# Patient Record
Sex: MALE | Race: WHITE | Employment: OTHER | URBAN - METROPOLITAN AREA
[De-identification: names, ages, dates, MRNs, and addresses within clinical notes are randomized per-mention and may not be internally consistent; named-entity substitution may affect disease eponyms.]

---

## 2017-01-02 DIAGNOSIS — I48.20 CHRONIC ATRIAL FIBRILLATION (H): Primary | ICD-10-CM

## 2017-01-02 RX ORDER — POTASSIUM CHLORIDE 1500 MG/1
20 TABLET, EXTENDED RELEASE ORAL 2 TIMES DAILY
Qty: 180 TABLET | Refills: 0 | Status: SHIPPED | OUTPATIENT
Start: 2017-01-02 | End: 2017-04-04

## 2017-01-02 NOTE — TELEPHONE ENCOUNTER
Potassium Chloride    HISTORICAL    Last Written Prescription Date: 09/27/16  Last Fill Quantity: 180, # refills: 0  Last Office Visit with G, P or [facility] prescribing provider: 03/18/16       POTASSIUM   Date Value Ref Range Status   03/18/2016 4.0 3.4 - 5.3 mmol/L Final     CREATININE   Date Value Ref Range Status   03/18/2016 1.00 0.66 - 1.25 mg/dL Final     BP Readings from Last 3 Encounters:   11/16/16 124/80   05/17/16 128/84   03/18/16 106/74

## 2017-01-25 ENCOUNTER — ANTICOAGULATION THERAPY VISIT (OUTPATIENT)
Dept: ANTICOAGULATION | Facility: CLINIC | Age: 67
End: 2017-01-25
Payer: COMMERCIAL

## 2017-01-25 DIAGNOSIS — I48.20 CHRONIC ATRIAL FIBRILLATION (H): ICD-10-CM

## 2017-01-25 DIAGNOSIS — Z79.01 LONG-TERM (CURRENT) USE OF ANTICOAGULANTS: Primary | ICD-10-CM

## 2017-01-25 LAB — INR POINT OF CARE: 3.1 (ref 0.86–1.14)

## 2017-01-25 PROCEDURE — 36416 COLLJ CAPILLARY BLOOD SPEC: CPT

## 2017-01-25 PROCEDURE — 85610 PROTHROMBIN TIME: CPT | Mod: QW

## 2017-01-25 PROCEDURE — 99207 ZZC NO CHARGE NURSE ONLY: CPT

## 2017-01-25 NOTE — PROGRESS NOTES
ANTICOAGULATION FOLLOW-UP CLINIC VISIT    Patient Name:  Angelito Espinoza  Date:  1/25/2017  Contact Type:  Face to Face    SUBJECTIVE:     Patient Findings     Positives No Problem Findings           OBJECTIVE    INR PROTIME   Date Value Ref Range Status   01/25/2017 3.1* 0.86 - 1.14 Final       ASSESSMENT / PLAN  INR assessment THER    Recheck INR In: 4 WEEKS    INR Location Clinic      Anticoagulation Summary as of 1/25/2017     INR goal 2.0-3.0   Selected INR 3.1! (1/25/2017)   Maintenance plan 5 mg (5 mg x 1) on Tue; 7.5 mg (5 mg x 1.5) all other days   Full instructions 5 mg on Tue; 7.5 mg all other days   Weekly total 50 mg   No change documented Nneka Gomez, RN   Plan last modified Sofya Luther RN (11/15/2016)   Next INR check 2/22/2017   Target end date     Indications   Long-term (current) use of anticoagulants [Z79.01] [Z79.01]  Chronic atrial fibrillation (H) [I48.2]         Anticoagulation Episode Summary     INR check location     Preferred lab     Send INR reminders to [facility]    Comments             See the Encounter Report to view Anticoagulation Flowsheet and Dosing Calendar (Go to Encounters tab in chart review, and find the Anticoagulation Therapy Visit)        Nneka Gomez RN

## 2017-02-27 ENCOUNTER — ANTICOAGULATION THERAPY VISIT (OUTPATIENT)
Dept: ANTICOAGULATION | Facility: CLINIC | Age: 67
End: 2017-02-27
Payer: COMMERCIAL

## 2017-02-27 DIAGNOSIS — I48.20 CHRONIC ATRIAL FIBRILLATION (H): ICD-10-CM

## 2017-02-27 DIAGNOSIS — Z79.01 LONG-TERM (CURRENT) USE OF ANTICOAGULANTS: ICD-10-CM

## 2017-02-27 LAB — INR POINT OF CARE: 2.2 (ref 0.86–1.14)

## 2017-02-27 PROCEDURE — 36416 COLLJ CAPILLARY BLOOD SPEC: CPT

## 2017-02-27 PROCEDURE — 85610 PROTHROMBIN TIME: CPT | Mod: QW

## 2017-02-27 PROCEDURE — 99207 ZZC NO CHARGE NURSE ONLY: CPT

## 2017-02-27 RX ORDER — LISINOPRIL 40 MG/1
40 TABLET ORAL DAILY
Qty: 90 TABLET | Refills: 0 | Status: SHIPPED | OUTPATIENT
Start: 2017-02-27 | End: 2017-04-04

## 2017-02-27 RX ORDER — AMLODIPINE BESYLATE 5 MG/1
5 TABLET ORAL DAILY
Qty: 90 TABLET | Refills: 0 | Status: SHIPPED | OUTPATIENT
Start: 2017-02-27 | End: 2017-04-04

## 2017-02-27 RX ORDER — METOPROLOL SUCCINATE 200 MG/1
200 TABLET, EXTENDED RELEASE ORAL 2 TIMES DAILY
Qty: 180 TABLET | Refills: 0 | Status: SHIPPED | OUTPATIENT
Start: 2017-02-27 | End: 2017-04-04

## 2017-02-27 NOTE — PROGRESS NOTES
ANTICOAGULATION FOLLOW-UP CLINIC VISIT    Patient Name:  Angelito Espinoza  Date:  2/27/2017  Contact Type:  Face to Face    SUBJECTIVE:     Patient Findings     Positives No Problem Findings           OBJECTIVE    INR Protime   Date Value Ref Range Status   02/27/2017 2.2 (A) 0.86 - 1.14 Final       ASSESSMENT / PLAN  INR assessment THER    Recheck INR In: 4 WEEKS    INR Location Clinic      Anticoagulation Summary as of 2/27/2017     INR goal 2.0-3.0   Today's INR 2.2   Maintenance plan 5 mg (5 mg x 1) on Tue; 7.5 mg (5 mg x 1.5) all other days   Full instructions 5 mg on Tue; 7.5 mg all other days   Weekly total 50 mg   No change documented Nneka Gomez, RN   Plan last modified Sofya Luther RN (11/15/2016)   Next INR check 3/28/2017   Target end date     Indications   Long-term (current) use of anticoagulants [Z79.01] [Z79.01]  Chronic atrial fibrillation (H) [I48.2]         Anticoagulation Episode Summary     INR check location     Preferred lab     Send INR reminders to [facility]    Comments             See the Encounter Report to view Anticoagulation Flowsheet and Dosing Calendar (Go to Encounters tab in chart review, and find the Anticoagulation Therapy Visit)        Nneka Gomez RN

## 2017-02-27 NOTE — MR AVS SNAPSHOT
Angelito Espinoza   2/27/2017 3:45 PM   Anticoagulation Therapy Visit    Description:  66 year old male   Provider:   ANTICOAGULATION CLINIC   Department:   Anti Coagulation           INR as of 2/27/2017     Today's INR 2.2      Anticoagulation Summary as of 2/27/2017     INR goal 2.0-3.0   Today's INR 2.2   Full instructions 5 mg on Tue; 7.5 mg all other days   Next INR check 3/28/2017    Indications   Long-term (current) use of anticoagulants [Z79.01] [Z79.01]  Chronic atrial fibrillation (H) [I48.2]         Your next Anticoagulation Clinic appointment(s)     Mar 29, 2017 11:30 AM CDT   Anticoagulation Visit with  ANTICOAGULATION CLINIC   [facility]    [address] [place] [address] [phone]              Contact Numbers     [facility]  Please call  [phone] to cancel and/or reschedule your appointment   Please call  [phone] with any problems or questions regarding your therapy.        February 2017 Details    Sun Mon Tue Wed Thu Fri Sat        1               2               3               4                 5               6               7               8               9               10               11                 12               13               14               15               16               17               18                 19               20               21               22               23               24               25                 26               27      7.5 mg   See details      28      5 mg              Date Details   02/27 This INR check               How to take your warfarin dose     To take:  5 mg Take 1 of the 5 mg tablets.    To take:  7.5 mg Take 1.5 of the 5 mg tablets.           March 2017 Details    Sun Mon Tue Wed Thu Fri Sat        1      7.5 mg         2      7.5 mg         3      7.5 mg         4      7.5 mg           5      7.5 mg         6      7.5 mg         7       5 mg         8      7.5 mg         9      7.5 mg         10      7.5 mg         11      7.5 mg           12      7.5 mg         13      7.5 mg         14      5 mg         15      7.5 mg         16      7.5 mg         17      7.5 mg         18      7.5 mg           19      7.5 mg         20      7.5 mg         21      5 mg         22      7.5 mg         23      7.5 mg         24      7.5 mg         25      7.5 mg           26      7.5 mg         27      7.5 mg         28            29               30               31                 Date Details   No additional details    Date of next INR:  3/28/2017         How to take your warfarin dose     To take:  5 mg Take 1 of the 5 mg tablets.    To take:  7.5 mg Take 1.5 of the 5 mg tablets.

## 2017-02-27 NOTE — TELEPHONE ENCOUNTER
metoprolol       Last Written Prescription Date: 03/18/16  Last Fill Quantity: 05/23/16, # refills: 180    Last Office Visit with [facility] or [facility] prescribing provider:  2   Future Office Visit:        BP Readings from Last 3 Encounters:   11/16/16 124/80   05/17/16 128/84   03/18/16 106/74     Lisinopril      Last Written Prescription Date: 05/23/16  Last Fill Quantity: 180, # refills: 2  Last Office Visit with [facility] or  [facility] prescribing provider: 03/18/16       Potassium   Date Value Ref Range Status   03/18/2016 4.0 3.4 - 5.3 mmol/L Final     Creatinine   Date Value Ref Range Status   03/18/2016 1.00 0.66 - 1.25 mg/dL Final     BP Readings from Last 3 Encounters:   11/16/16 124/80   05/17/16 128/84   03/18/16 106/74       Amlodipine      Last Written Prescription Date: 11/25/16  Last Fill Quantity: 90, # refills: 0    Last Office Visit with [facility] or  Health prescribing provider:  03/18/16   Future Office Visit:        BP Readings from Last 3 Encounters:   11/16/16 124/80   05/17/16 128/84   03/18/16 106/74

## 2017-03-07 DIAGNOSIS — I48.20 CHRONIC ATRIAL FIBRILLATION (H): ICD-10-CM

## 2017-03-07 RX ORDER — WARFARIN SODIUM 5 MG/1
7.5 TABLET ORAL DAILY
Qty: 45 TABLET | Refills: 0 | Status: SHIPPED
Start: 2017-03-07 | End: 2017-04-04

## 2017-03-07 NOTE — TELEPHONE ENCOUNTER
warfarin    Last Written Prescription Date: 11/25/16  Last Fill Qty: 135, # refills: 0  Last Office Visit with G, P or [facility] prescribing provider: 03/18/16       Date and Result of Last PT/INR:   Lab Results   Component Value Date    INR 2.2 02/27/2017    INR 3.1 01/25/2017    INR 2.16 09/13/2015    INR 3.00 09/25/2014

## 2017-04-04 ENCOUNTER — OFFICE VISIT (OUTPATIENT)
Dept: INTERNAL MEDICINE | Facility: CLINIC | Age: 67
End: 2017-04-04
Payer: COMMERCIAL

## 2017-04-04 ENCOUNTER — ANTICOAGULATION THERAPY VISIT (OUTPATIENT)
Dept: ANTICOAGULATION | Facility: CLINIC | Age: 67
End: 2017-04-04
Payer: COMMERCIAL

## 2017-04-04 VITALS
SYSTOLIC BLOOD PRESSURE: 114 MMHG | BODY MASS INDEX: 42.66 KG/M2 | WEIGHT: 315 LBS | HEIGHT: 72 IN | DIASTOLIC BLOOD PRESSURE: 72 MMHG | OXYGEN SATURATION: 97 % | TEMPERATURE: 98.1 F | HEART RATE: 78 BPM

## 2017-04-04 DIAGNOSIS — I48.20 CHRONIC ATRIAL FIBRILLATION (H): Primary | ICD-10-CM

## 2017-04-04 DIAGNOSIS — I10 ESSENTIAL HYPERTENSION, BENIGN: ICD-10-CM

## 2017-04-04 DIAGNOSIS — E66.01 MORBID OBESITY DUE TO EXCESS CALORIES (H): ICD-10-CM

## 2017-04-04 DIAGNOSIS — Z12.5 SPECIAL SCREENING FOR MALIGNANT NEOPLASM OF PROSTATE: ICD-10-CM

## 2017-04-04 DIAGNOSIS — Z12.11 COLON CANCER SCREENING: ICD-10-CM

## 2017-04-04 DIAGNOSIS — E78.5 HYPERLIPIDEMIA LDL GOAL <130: ICD-10-CM

## 2017-04-04 LAB
ALBUMIN SERPL-MCNC: 3.6 G/DL (ref 3.4–5)
ALP SERPL-CCNC: 71 U/L (ref 40–150)
ALT SERPL W P-5'-P-CCNC: 24 U/L (ref 0–70)
ANION GAP SERPL CALCULATED.3IONS-SCNC: 7 MMOL/L (ref 3–14)
AST SERPL W P-5'-P-CCNC: ABNORMAL U/L (ref 0–45)
BILIRUB SERPL-MCNC: 1.2 MG/DL (ref 0.2–1.3)
BUN SERPL-MCNC: 23 MG/DL (ref 7–30)
CALCIUM SERPL-MCNC: 9 MG/DL (ref 8.5–10.1)
CHLORIDE SERPL-SCNC: 109 MMOL/L (ref 94–109)
CHOLEST SERPL-MCNC: 191 MG/DL
CO2 SERPL-SCNC: 26 MMOL/L (ref 20–32)
CREAT SERPL-MCNC: 1.16 MG/DL (ref 0.66–1.25)
GFR SERPL CREATININE-BSD FRML MDRD: 63 ML/MIN/1.7M2
GLUCOSE SERPL-MCNC: 108 MG/DL (ref 70–99)
HDLC SERPL-MCNC: 54 MG/DL
INR POINT OF CARE: 3.3 (ref 0.86–1.14)
LDLC SERPL CALC-MCNC: 114 MG/DL
NONHDLC SERPL-MCNC: 137 MG/DL
POTASSIUM SERPL-SCNC: ABNORMAL MMOL/L (ref 3.4–5.3)
PROT SERPL-MCNC: 7.5 G/DL (ref 6.8–8.8)
PSA SERPL-ACNC: 0.35 UG/L (ref 0–4)
SODIUM SERPL-SCNC: 142 MMOL/L (ref 133–144)
TRIGL SERPL-MCNC: 113 MG/DL

## 2017-04-04 PROCEDURE — 99213 OFFICE O/P EST LOW 20 MIN: CPT | Performed by: INTERNAL MEDICINE

## 2017-04-04 PROCEDURE — 85610 PROTHROMBIN TIME: CPT | Mod: QW

## 2017-04-04 PROCEDURE — 80053 COMPREHEN METABOLIC PANEL: CPT | Performed by: INTERNAL MEDICINE

## 2017-04-04 PROCEDURE — 36416 COLLJ CAPILLARY BLOOD SPEC: CPT

## 2017-04-04 PROCEDURE — G0103 PSA SCREENING: HCPCS | Performed by: INTERNAL MEDICINE

## 2017-04-04 PROCEDURE — 80061 LIPID PANEL: CPT | Performed by: INTERNAL MEDICINE

## 2017-04-04 RX ORDER — WARFARIN SODIUM 5 MG/1
7.5 TABLET ORAL DAILY
Qty: 135 TABLET | Refills: 3 | Status: SHIPPED | OUTPATIENT
Start: 2017-04-04

## 2017-04-04 RX ORDER — AMLODIPINE BESYLATE 5 MG/1
5 TABLET ORAL DAILY
Qty: 90 TABLET | Refills: 3 | Status: SHIPPED | OUTPATIENT
Start: 2017-04-04

## 2017-04-04 RX ORDER — LISINOPRIL 40 MG/1
40 TABLET ORAL DAILY
Qty: 90 TABLET | Refills: 3 | Status: SHIPPED | OUTPATIENT
Start: 2017-04-04

## 2017-04-04 RX ORDER — FUROSEMIDE 20 MG
20 TABLET ORAL 2 TIMES DAILY
Qty: 180 TABLET | Refills: 3 | Status: SHIPPED | OUTPATIENT
Start: 2017-04-04

## 2017-04-04 RX ORDER — METOPROLOL SUCCINATE 200 MG/1
200 TABLET, EXTENDED RELEASE ORAL 2 TIMES DAILY
Qty: 180 TABLET | Refills: 3 | Status: SHIPPED | OUTPATIENT
Start: 2017-04-04

## 2017-04-04 RX ORDER — POTASSIUM CHLORIDE 1500 MG/1
20 TABLET, EXTENDED RELEASE ORAL 2 TIMES DAILY
Qty: 180 TABLET | Refills: 3 | Status: SHIPPED | OUTPATIENT
Start: 2017-04-04

## 2017-04-04 NOTE — MR AVS SNAPSHOT
Angelito Espinoza   4/4/2017 3:15 PM   Anticoagulation Therapy Visit    Description:  66 year old male   Provider:   ANTICOAGULATION CLINIC   Department:   Anti Coagulation           INR as of 4/4/2017     Today's INR 3.3!      Anticoagulation Summary as of 4/4/2017     INR goal 2.0-3.0   Today's INR 3.3!   Full instructions 4/5: 5 mg; Otherwise 5 mg on Tue; 7.5 mg all other days   Next INR check 5/3/2017    Indications   Long-term (current) use of anticoagulants [Z79.01] [Z79.01]  Chronic atrial fibrillation (H) [I48.2]         Your next Anticoagulation Clinic appointment(s)     May 03, 2017 11:30 AM CDT   Anticoagulation Visit with  ANTICOAGULATION CLINIC   [facility]    [address] [place] [address] [phone]              Contact Numbers     [facility]  Please call  [phone] to cancel and/or reschedule your appointment   Please call  [phone] with any problems or questions regarding your therapy.        April 2017 Details    Sun Mon Tue Wed Thu Fri Sat           1                 2               3               4      5 mg   See details      5      5 mg         6      7.5 mg         7      7.5 mg         8      7.5 mg           9      7.5 mg         10      7.5 mg         11      5 mg         12      7.5 mg         13      7.5 mg         14      7.5 mg         15      7.5 mg           16      7.5 mg         17      7.5 mg         18      5 mg         19      7.5 mg         20      7.5 mg         21      7.5 mg         22      7.5 mg           23      7.5 mg         24      7.5 mg         25      5 mg         26      7.5 mg         27      7.5 mg         28      7.5 mg         29      7.5 mg           30      7.5 mg                Date Details   04/04 This INR check               How to take your warfarin dose     To take:  5 mg Take 1 of the 5 mg tablets.    To take:  7.5 mg Take 1.5 of the 5 mg tablets.            May 2017 Details    Sun Mon Tue Wed Thu Fri Sat      1      7.5 mg         2      5 mg         3            4               5               6                 7               8               9               10               11               12               13                 14               15               16               17               18               19               20                 21               22               23               24               25               26               27                 28               29               30               31                   Date Details   No additional details    Date of next INR:  5/3/2017         How to take your warfarin dose     To take:  5 mg Take 1 of the 5 mg tablets.    To take:  7.5 mg Take 1.5 of the 5 mg tablets.

## 2017-04-04 NOTE — LETTER
[facility]  [address]  [address]      April 5, 2017      Angelito Espinoza  [address]  [facility] [address]          Dear Angelito,      I have enclosed a copy of your most recent labs done here at the clinic and if available some of your prior labs for comparison.     I am pleased to inform you that your routine blood work including your sodium, potassium, calcium, kidney and liver function tests are all stable.    Your blood sugar function test is slightly abnormal and elevated and should be rechecked here in the clinic in 6 months with a follow-up visit with me, case.  I will look forward to seeing you at that time and please call to make an appointment.  In the meantime, please work on your diet limiting your carbohydrates and getting some good, regular exercise.    Your LDL cholesterol is slightly high and could be treated more aggressively.  Please follow-up with me to discuss your further medication options if you are interested.    In addition, your PSA or prostate screening antigen is normal and should be repeated annually.    Please call me if you have further questions.        Guevara Billy MD

## 2017-04-04 NOTE — PROGRESS NOTES
ANTICOAGULATION FOLLOW-UP CLINIC VISIT    Patient Name:  Angelito Espinoza  Date:  4/4/2017  Contact Type:  Face to Face    SUBJECTIVE:     Patient Findings     Positives No Problem Findings           OBJECTIVE    INR Protime   Date Value Ref Range Status   04/04/2017 3.3 (A) 0.86 - 1.14 Final       ASSESSMENT / PLAN  INR assessment SUPRA    Recheck INR In: 4 WEEKS    INR Location Clinic      Anticoagulation Summary as of 4/4/2017     INR goal 2.0-3.0   Today's INR 3.3!   Maintenance plan 5 mg (5 mg x 1) on Tue; 7.5 mg (5 mg x 1.5) all other days   Full instructions 4/5: 5 mg; Otherwise 5 mg on Tue; 7.5 mg all other days   Weekly total 50 mg   Plan last modified Sofya Luther RN (11/15/2016)   Next INR check 5/3/2017   Target end date     Indications   Long-term (current) use of anticoagulants [Z79.01] [Z79.01]  Chronic atrial fibrillation (H) [I48.2]         Anticoagulation Episode Summary     INR check location     Preferred lab     Send INR reminders to [facility]    Comments             See the Encounter Report to view Anticoagulation Flowsheet and Dosing Calendar (Go to Encounters tab in chart review, and find the Anticoagulation Therapy Visit)        Sofya Luther RN

## 2017-04-04 NOTE — NURSING NOTE
Chief Complaint   Patient presents with     Recheck Medication       Initial /72  Pulse 78  Temp 98.1  F (36.7  C) (Oral)  Ht 6' (1.829 m)  Wt (!) 400 lb 8 oz (181.7 kg)  SpO2 97%  BMI 54.32 kg/m2 Estimated body mass index is 54.32 kg/(m^2) as calculated from the following:    Height as of this encounter: 6' (1.829 m).    Weight as of this encounter: 400 lb 8 oz (181.7 kg).  Medication Reconciliation: complete

## 2017-04-04 NOTE — MR AVS SNAPSHOT
After Visit Summary   4/4/2017    Angelito Espinoza    MRN: [id number]           Patient Information     Date Of Birth          1950        Visit Information        Provider Department      4/4/2017 3:40 PM Guevara Billy MD [facility]        Today's Diagnoses     Chronic atrial fibrillation (H)    -  1    Morbid obesity due to excess calories (HCC) BMI 50-55        Essential hypertension, benign        Hyperlipidemia LDL goal <130        Special screening for malignant neoplasm of prostate        Colon cancer screening           Follow-ups after your visit        Additional Services     GASTROENTEROLOGY ADULT REF PROCEDURE ONLY       Last Lab Result: Creatinine (mg/dL)       Date                     Value                 03/18/2016               1.00             ----------  Body mass index is 54.32 kg/(m^2).     Needed:  No  Language:  English    Patient will be contacted to schedule procedure.     Please be aware that coverage of these services is subject to the terms and limitations of your health insurance plan.  Call member services at your health plan with any benefit or coverage questions.  Any procedures must be performed at a [facility] facility OR coordinated by your clinic's referral office.    Please bring the following with you to your appointment:    (1) Any X-Rays, CTs or MRIs which have been performed.  Contact the facility where they were done to arrange for  prior to your scheduled appointment.    (2) List of current medications   (3) This referral request   (4) Any documents/labs given to you for this referral                  Your next 10 appointments already scheduled     May 03, 2017 11:30 AM CDT   Anticoagulation Visit with  ANTICOAGULATION [facility] ([facility])    [address] [address] [phone]              Who to contact     If you have  questions or need follow up information about today's clinic visit or your schedule please contact [facility] directly at [phone].  Normal or non-critical lab and imaging results will be communicated to you by MyChart, letter or phone within 4 business days after the clinic has received the results. If you do not hear from us within 7 days, please contact the clinic through MyChart or phone. If you have a critical or abnormal lab result, we will notify you by phone as soon as possible.  Submit refill requests through [employer] or call your pharmacy and they will forward the refill request to us. Please allow 3 business days for your refill to be completed.          Additional Information About Your Visit        [employer] Information     [employer] gives you secure access to your electronic health record. If you see a primary care provider, you can also send messages to your care team and make appointments. If you have questions, please call your primary care clinic.  If you do not have a primary care provider, please call [phone] and they will assist you.        Care EveryWhere ID     This is your Care EveryWhere ID. This could be used by other organizations to access your [place] medical records  [id number]        Your Vitals Were     Pulse Temperature Height Pulse Oximetry BMI (Body Mass Index)       78 98.1  F (36.7  C) (Oral) 6' (1.829 m) 97% 54.32 kg/m2        Blood Pressure from Last 3 Encounters:   04/04/17 114/72   11/16/16 124/80   05/17/16 128/84    Weight from Last 3 Encounters:   04/04/17 (!) 400 lb 8 oz (181.7 kg)   11/16/16 (!) 405 lb 14.4 oz (184.1 kg)   05/17/16 (!) 380 lb (172.4 kg)              We Performed the Following     Comprehensive metabolic panel     GASTROENTEROLOGY ADULT REF PROCEDURE ONLY     Lipid Profile     PSA, screen          Where to get your medicines      These medications were sent to [facility] [address] [place], [place] - [address]  AT [facility] [address] [place] [address] [facility] [address]     Phone:  [phone]     amLODIPine 5 MG tablet    furosemide 20 MG tablet    lisinopril 40 MG tablet    metoprolol 200 MG 24 hr tablet    potassium chloride SA 20 MEQ CR tablet         Some of these will need a paper prescription and others can be bought over the counter.  Ask your nurse if you have questions.     Bring a paper prescription for each of these medications     warfarin 5 MG tablet          Primary Care Provider Office Phone # Fax #    Guevara Billy [phone]       [facility] [address]  [facility] [address]        Thank you!     Thank you for choosing [facility]  for your care. Our goal is always to provide you with excellent care. Hearing back from our patients is one way we can continue to improve our services. Please take a few minutes to complete the written survey that you may receive in the mail after your visit with us. Thank you!             Your Updated Medication List - Protect others around you: Learn how to safely use, store and throw away your medicines at www.disposemymeds.org.          This list is accurate as of: 4/4/17  3:57 PM.  Always use your most recent med list.                   Brand Name Dispense Instructions for use    amLODIPine 5 MG tablet    NORVASC    90 tablet    Take 1 tablet (5 mg) by mouth daily       furosemide 20 MG tablet    LASIX    180 tablet    Take 1 tablet (20 mg) by mouth 2 times daily       HYDROcodone-acetaminophen 5-325 MG per tablet    NORCO    8 tablet    Take 1-2 tablets by mouth every 4 hours as needed for moderate to severe pain       lisinopril 40 MG tablet    PRINIVIL/ZESTRIL    90 tablet    Take 1 tablet (40 mg) by mouth daily       metoprolol 200 MG 24 hr tablet    TOPROL XL    180 tablet    Take 1 tablet (200 mg) by mouth 2 times daily Verified with patient this is the XL       multivitamin, therapeutic with  minerals Tabs tablet      Take 1 tablet by mouth daily       potassium chloride SA 20 MEQ CR tablet    potassium chloride    180 tablet    Take 1 tablet (20 mEq) by mouth 2 times daily       warfarin 5 MG tablet    COUMADIN    135 tablet    Take 1.5 tablets (7.5 mg) by mouth daily Except 1 tablet on Tues as directed by the anticoagulation clinic, an appt needed with primary MD pror to refills

## 2017-05-01 ENCOUNTER — TELEPHONE (OUTPATIENT)
Dept: INTERNAL MEDICINE | Facility: CLINIC | Age: 67
End: 2017-05-01

## 2017-05-01 NOTE — TELEPHONE ENCOUNTER
"Pt is trying to get a colonoscopy scheduled with [facility], but before procedure can be scheduled, [facility] needs to receive orders from PCP to \"hold pt's warfarin for 4 days prior to colonoscopy, and bridging recommendations.\"  Please call the ph# from [facility] [phone]  And press option 1, and then 1 again, and give PCP orders to the .  "

## 2017-05-01 NOTE — TELEPHONE ENCOUNTER
Called [facility] and spoke with RINA.  Informed per Dr. Billy OK to hold coumadin for colonoscopy.  PJ stated she will contact patient to let him know.

## 2017-05-03 ENCOUNTER — ANTICOAGULATION THERAPY VISIT (OUTPATIENT)
Dept: ANTICOAGULATION | Facility: CLINIC | Age: 67
End: 2017-05-03
Payer: COMMERCIAL

## 2017-05-03 DIAGNOSIS — I48.20 CHRONIC ATRIAL FIBRILLATION (H): ICD-10-CM

## 2017-05-03 DIAGNOSIS — Z79.01 LONG-TERM (CURRENT) USE OF ANTICOAGULANTS: ICD-10-CM

## 2017-05-03 LAB — INR POINT OF CARE: 2.5 (ref 0.86–1.14)

## 2017-05-03 PROCEDURE — 85610 PROTHROMBIN TIME: CPT | Mod: QW

## 2017-05-03 PROCEDURE — 36416 COLLJ CAPILLARY BLOOD SPEC: CPT

## 2017-05-03 PROCEDURE — 99207 ZZC NO CHARGE NURSE ONLY: CPT

## 2017-05-03 NOTE — MR AVS SNAPSHOT
Angelito Espinoza   5/3/2017 11:30 AM   Anticoagulation Therapy Visit    Description:  66 year old male   Provider:   ANTICOAGULATION CLINIC   Department:   Anti Coagulation           INR as of 5/3/2017     Today's INR 2.5      Anticoagulation Summary as of 5/3/2017     INR goal 2.0-3.0   Today's INR 2.5   Full instructions 5 mg on Tue; 7.5 mg all other days   Next INR check 5/31/2017    Indications   Long-term (current) use of anticoagulants [Z79.01] [Z79.01]  Chronic atrial fibrillation (H) [I48.2]         Your next Anticoagulation Clinic appointment(s)     May 31, 2017 11:30 AM CDT   Anticoagulation Visit with  ANTICOAGULATION CLINIC   [facility] ([facility]    [address] [place] [address] [phone]              Contact Numbers     [facility]  Please call  [phone] to cancel and/or reschedule your appointment   Please call  [phone] with any problems or questions regarding your therapy.        May 2017 Details    Sun Mon Tue Wed Thu Fri Sat      1               2               3      7.5 mg   See details      4      7.5 mg         5      7.5 mg         6      7.5 mg           7      7.5 mg         8      7.5 mg         9      5 mg         10      7.5 mg         11      7.5 mg         12      7.5 mg         13      7.5 mg           14      7.5 mg         15      7.5 mg         16      5 mg         17      7.5 mg         18      7.5 mg         19      7.5 mg         20      7.5 mg           21      7.5 mg         22      7.5 mg         23      5 mg         24      7.5 mg         25      7.5 mg         26      7.5 mg         27      7.5 mg           28      7.5 mg         29      7.5 mg         30      5 mg         31                Date Details   05/03 This INR check       Date of next INR:  5/31/2017         How to take your warfarin dose     To take:  5 mg Take 1 of the 5 mg tablets.    To take:  7.5 mg Take 1.5 of the  5 mg tablets.

## 2017-05-03 NOTE — PROGRESS NOTES
ANTICOAGULATION FOLLOW-UP CLINIC VISIT    Patient Name:  Angelito Espinoza  Date:  5/3/2017  Contact Type:  Face to Face    SUBJECTIVE:        OBJECTIVE    INR Protime   Date Value Ref Range Status   05/03/2017 2.5 (A) 0.86 - 1.14 Final       ASSESSMENT / PLAN  INR assessment THER    Recheck INR In: 4 WEEKS    INR Location Clinic      Anticoagulation Summary as of 5/3/2017     INR goal 2.0-3.0   Today's INR 2.5   Maintenance plan 5 mg (5 mg x 1) on Tue; 7.5 mg (5 mg x 1.5) all other days   Full instructions 5 mg on Tue; 7.5 mg all other days   Weekly total 50 mg   No change documented Morenita Brink RN   Plan last modified Sofya Luther RN (11/15/2016)   Next INR check 5/31/2017   Target end date     Indications   Long-term (current) use of anticoagulants [Z79.01] [Z79.01]  Chronic atrial fibrillation (H) [I48.2]         Anticoagulation Episode Summary     INR check location     Preferred lab     Send INR reminders to [facility]    Comments             See the Encounter Report to view Anticoagulation Flowsheet and Dosing Calendar (Go to Encounters tab in chart review, and find the Anticoagulation Therapy Visit)    Dosage adjustment made based on physician directed care plan.    Morenita Brink RN

## 2017-05-31 ENCOUNTER — ANTICOAGULATION THERAPY VISIT (OUTPATIENT)
Dept: ANTICOAGULATION | Facility: CLINIC | Age: 67
End: 2017-05-31
Payer: COMMERCIAL

## 2017-05-31 DIAGNOSIS — I48.20 CHRONIC ATRIAL FIBRILLATION (H): ICD-10-CM

## 2017-05-31 DIAGNOSIS — Z79.01 LONG-TERM (CURRENT) USE OF ANTICOAGULANTS: ICD-10-CM

## 2017-05-31 PROCEDURE — 99207 ZZC NO CHARGE NURSE ONLY: CPT

## 2017-05-31 NOTE — MR AVS SNAPSHOT
Angelito Espinoza   5/31/2017 11:30 AM   Anticoagulation Therapy Visit    Description:  66 year old male   Provider:   ANTICOAGULATION CLINIC   Department:   Anti Coagulation           INR as of 5/31/2017     Today's INR       Anticoagulation Summary as of 5/31/2017     INR goal 2.0-3.0   Today's INR    Full instructions 5/31: 5 mg; Otherwise 5 mg on Tue; 7.5 mg all other days   Next INR check 7/6/2017    Indications   Long-term (current) use of anticoagulants [Z79.01] [Z79.01]  Chronic atrial fibrillation (H) [I48.2]         Your next Anticoagulation Clinic appointment(s)     Jul 06, 2017 12:00 PM CDT   Anticoagulation Visit with  ANTICOAGULATION CLINIC   [facility] ([facility])    [address] [place] [id number] [phone]              Contact Numbers     [facility]  Please call  [phone] to cancel and/or reschedule your appointment   Please call  [phone] with any problems or questions regarding your therapy.        May 2017 Details    Sun Mon Tue Wed Thu Fri Sat      1               2               3               4               5               6                 7               8               9               10               11               12               13                 14               15               16               17               18               19               20                 21               22               23               24               25               26               27                 28               29               30               31      5 mg   See details          Date Details   05/31 This INR check               How to take your warfarin dose     To take:  5 mg Take 1 of the 5 mg tablets.           June 2017 Details    Sun Mon Tue Wed Thu Fri Sat         1      7.5 mg         2      7.5 mg         3      7.5 mg           4      7.5 mg         5      7.5 mg         6      5 mg          7      7.5 mg         8      7.5 mg         9      7.5 mg         10      7.5 mg           11      7.5 mg         12      7.5 mg         13      5 mg         14      7.5 mg         15      7.5 mg         16      7.5 mg         17      7.5 mg           18      7.5 mg         19      7.5 mg         20      5 mg         21      7.5 mg         22      7.5 mg         23      7.5 mg         24      7.5 mg           25      7.5 mg         26      7.5 mg         27      5 mg         28      7.5 mg         29      7.5 mg         30      7.5 mg           Date Details   No additional details            How to take your warfarin dose     To take:  5 mg Take 1 of the 5 mg tablets.    To take:  7.5 mg Take 1.5 of the 5 mg tablets.           July 2017 Details    Sun Mon Tue Wed Thu Fri Sat           1      7.5 mg           2      7.5 mg         3      7.5 mg         4      5 mg         5      7.5 mg         6            7               8                 9               10               11               12               13               14               15                 16               17               18               19               20               21               22                 23               24               25               26               27               28               29                 30               31                     Date Details   No additional details    Date of next INR:  7/6/2017         How to take your warfarin dose     To take:  5 mg Take 1 of the 5 mg tablets.    To take:  7.5 mg Take 1.5 of the 5 mg tablets.

## 2017-06-01 NOTE — PROGRESS NOTES
ANTICOAGULATION FOLLOW-UP CLINIC VISIT    Patient Name:  Angelito Espinoza  Date:  05/31/2017  Contact Type:  Face to Face    SUBJECTIVE:     Patient Findings     Positives No Problem Findings           OBJECTIVE    INR Protime   Date Value Ref Range Status   05/03/2017 2.5 (A) 0.86 - 1.14 Final       ASSESSMENT / PLAN  No question data found.  Anticoagulation Summary as of 5/31/2017     INR goal 2.0-3.0   Today's INR    Maintenance plan 5 mg (5 mg x 1) on Tue; 7.5 mg (5 mg x 1.5) all other days   Full instructions 5/31: 5 mg; Otherwise 5 mg on Tue; 7.5 mg all other days   Weekly total 50 mg   Plan last modified Sofya Luther RN (11/15/2016)   Next INR check 7/6/2017   Target end date     Indications   Long-term (current) use of anticoagulants [Z79.01] [Z79.01]  Chronic atrial fibrillation (H) [I48.2]         Anticoagulation Episode Summary     INR check location     Preferred lab     Send INR reminders to  [facility]    Comments             See the Encounter Report to view Anticoagulation Flowsheet and Dosing Calendar (Go to Encounters tab in chart review, and find the Anticoagulation Therapy Visit)        Nneka Gomez RN

## 2017-07-06 ENCOUNTER — TRANSFERRED RECORDS (OUTPATIENT)
Dept: HEALTH INFORMATION MANAGEMENT | Facility: CLINIC | Age: 67
End: 2017-07-06

## 2017-07-06 ENCOUNTER — ANTICOAGULATION THERAPY VISIT (OUTPATIENT)
Dept: ANTICOAGULATION | Facility: CLINIC | Age: 67
End: 2017-07-06
Payer: COMMERCIAL

## 2017-07-06 DIAGNOSIS — I48.20 CHRONIC ATRIAL FIBRILLATION (H): ICD-10-CM

## 2017-07-06 DIAGNOSIS — Z79.01 LONG-TERM (CURRENT) USE OF ANTICOAGULANTS: ICD-10-CM

## 2017-07-06 LAB — INR PPP: 2.4

## 2017-07-06 PROCEDURE — 99207 ZZC NO CHARGE NURSE ONLY: CPT | Performed by: INTERNAL MEDICINE

## 2017-07-06 NOTE — MR AVS SNAPSHOT
Angelito Espinoza   7/6/2017   Anticoagulation Therapy Visit    Description:  66 year old male   Provider:  Guevara Billy MD   Department:   Anti Coagulation           INR as of 7/6/2017     Today's INR 2.4      Anticoagulation Summary as of 7/6/2017     INR goal 2.0-3.0   Today's INR 2.4   Full instructions 5 mg on Tue; 7.5 mg all other days   Next INR check 8/10/2017    Indications   Long-term (current) use of anticoagulants [Z79.01] [Z79.01]  Chronic atrial fibrillation (H) [I48.2]         Contact Numbers     [facility] [address]  Please call  [phone] to cancel and/or reschedule your appointment   Please call  [phone] with any problems or questions regarding your therapy.        July 2017 Details    Sun Mon Tue Wed Thu Fri Sat           1                 2               3               4               5               6      7.5 mg   See details      7      7.5 mg         8      7.5 mg           9      7.5 mg         10      7.5 mg         11      5 mg         12      7.5 mg         13      7.5 mg         14      7.5 mg         15      7.5 mg           16      7.5 mg         17      7.5 mg         18      5 mg         19      7.5 mg         20      7.5 mg         21      7.5 mg         22      7.5 mg           23      7.5 mg         24      7.5 mg         25      5 mg         26      7.5 mg         27      7.5 mg         28      7.5 mg         29      7.5 mg           30      7.5 mg         31      7.5 mg               Date Details   07/06 This INR check               How to take your warfarin dose     To take:  5 mg Take 1 of the 5 mg tablets.    To take:  7.5 mg Take 1.5 of the 5 mg tablets.           August 2017 Details    Sun Mon Tue Wed Thu Fri Sat       1      5 mg         2      7.5 mg         3      7.5 mg         4      7.5 mg         5      7.5 mg           6      7.5 mg         7      7.5 mg         8      5 mg         9      7.5 mg         10            11               12                  13               14               15               16               17               18               19                 20               21               22               23               24               25               26                 27               28               29               30               31                  Date Details   No additional details    Date of next INR:  8/10/2017         How to take your warfarin dose     To take:  5 mg Take 1 of the 5 mg tablets.    To take:  7.5 mg Take 1.5 of the 5 mg tablets.

## 2017-07-13 NOTE — PROGRESS NOTES
ANTICOAGULATION FOLLOW-UP CLINIC VISIT    Patient Name:  Angelito Espinoza  Date:  7/06/2017  Contact Type:  Telephone/ dosing called to pt     SUBJECTIVE:     Patient Findings     Positives No Problem Findings    Comments Pt is currently out of town           OBJECTIVE    INR   Date Value Ref Range Status   07/06/2017 2.4  Final       ASSESSMENT / PLAN  No question data found.  Anticoagulation Summary as of 7/6/2017     INR goal 2.0-3.0   Today's INR 2.4   Maintenance plan 5 mg (5 mg x 1) on Tue; 7.5 mg (5 mg x 1.5) all other days   Full instructions 5 mg on Tue; 7.5 mg all other days   Weekly total 50 mg   No change documented Morenita Brink RN   Plan last modified Sofya Luther RN (11/15/2016)   Next INR check 8/10/2017   Target end date     Indications   Long-term (current) use of anticoagulants [Z79.01] [Z79.01]  Chronic atrial fibrillation (H) [I48.2]         Anticoagulation Episode Summary     INR check location     Preferred lab     Send INR reminders to [facility]    Comments             See the Encounter Report to view Anticoagulation Flowsheet and Dosing Calendar (Go to Encounters tab in chart review, and find the Anticoagulation Therapy Visit)    Dosage adjustment made based on physician directed care plan.    Morenita Brink, RN

## 2017-07-21 ENCOUNTER — OFFICE VISIT (OUTPATIENT)
Dept: INTERNAL MEDICINE | Facility: CLINIC | Age: 67
End: 2017-07-21
Payer: COMMERCIAL

## 2017-07-21 VITALS
WEIGHT: 315 LBS | DIASTOLIC BLOOD PRESSURE: 76 MMHG | TEMPERATURE: 97.6 F | HEIGHT: 72 IN | OXYGEN SATURATION: 94 % | HEART RATE: 84 BPM | BODY MASS INDEX: 42.66 KG/M2 | SYSTOLIC BLOOD PRESSURE: 110 MMHG

## 2017-07-21 DIAGNOSIS — Z12.11 SPECIAL SCREENING FOR MALIGNANT NEOPLASMS, COLON: ICD-10-CM

## 2017-07-21 DIAGNOSIS — I48.20 CHRONIC ATRIAL FIBRILLATION (H): ICD-10-CM

## 2017-07-21 DIAGNOSIS — I10 ESSENTIAL HYPERTENSION, BENIGN: ICD-10-CM

## 2017-07-21 DIAGNOSIS — Z79.01 LONG-TERM (CURRENT) USE OF ANTICOAGULANTS: ICD-10-CM

## 2017-07-21 DIAGNOSIS — Z01.818 PREOP GENERAL PHYSICAL EXAM: Primary | ICD-10-CM

## 2017-07-21 DIAGNOSIS — E78.5 HYPERLIPIDEMIA LDL GOAL <130: ICD-10-CM

## 2017-07-21 LAB
ANION GAP SERPL CALCULATED.3IONS-SCNC: 6 MMOL/L (ref 3–14)
BUN SERPL-MCNC: 21 MG/DL (ref 7–30)
CALCIUM SERPL-MCNC: 9 MG/DL (ref 8.5–10.1)
CHLORIDE SERPL-SCNC: 107 MMOL/L (ref 94–109)
CO2 SERPL-SCNC: 29 MMOL/L (ref 20–32)
CREAT SERPL-MCNC: 1.16 MG/DL (ref 0.66–1.25)
GFR SERPL CREATININE-BSD FRML MDRD: 63 ML/MIN/1.7M2
GLUCOSE SERPL-MCNC: 109 MG/DL (ref 70–99)
HGB BLD-MCNC: 15.3 G/DL (ref 13.3–17.7)
POTASSIUM SERPL-SCNC: 4.2 MMOL/L (ref 3.4–5.3)
SODIUM SERPL-SCNC: 142 MMOL/L (ref 133–144)

## 2017-07-21 PROCEDURE — 85018 HEMOGLOBIN: CPT | Performed by: PHYSICIAN ASSISTANT

## 2017-07-21 PROCEDURE — 99214 OFFICE O/P EST MOD 30 MIN: CPT | Performed by: PHYSICIAN ASSISTANT

## 2017-07-21 PROCEDURE — 36415 COLL VENOUS BLD VENIPUNCTURE: CPT | Performed by: PHYSICIAN ASSISTANT

## 2017-07-21 PROCEDURE — 80048 BASIC METABOLIC PNL TOTAL CA: CPT | Performed by: PHYSICIAN ASSISTANT

## 2017-07-21 NOTE — PATIENT INSTRUCTIONS
Hold lisinopril lasix and potassium the morning of procedure    DO TAKE your amlodipine, and metoprolol with a sip of water.      Before Your Surgery      Call your surgeon if there is any change in your health. This includes signs of a cold or flu (such as a sore throat, runny nose, cough, rash or fever).    Do not smoke, drink alcohol or take over the counter medicine (unless your surgeon or primary care doctor tells you to) for the 24 hours before and after surgery.    If you take prescribed drugs: Follow your doctor s orders about which medicines to take and which to stop until after surgery.    Eating and drinking prior to surgery: follow the instructions from your surgeon    Take a shower or bath the night before surgery. Use the soap your surgeon gave you to gently clean your skin. If you do not have soap from your surgeon, use your regular soap. Do not shave or scrub the surgery site.  Wear clean pajamas and have clean sheets on your bed.

## 2017-07-21 NOTE — MR AVS SNAPSHOT
After Visit Summary   7/21/2017    Angelito Espinoza    MRN: [id number]           Patient Information     Date Of Birth          1950        Visit Information        Provider Department      7/21/2017 2:20 PM Rosa Velez PA-C [facility]        Today's Diagnoses     Preop general physical exam    -  1    Special screening for malignant neoplasms, colon        Chronic atrial fibrillation (H)        Long-term (current) use of anticoagulants [Z79.01]        Essential hypertension, benign        Hyperlipidemia LDL goal <130          Care Instructions    Hold lisinopril lasix and potassium the morning of procedure    DO TAKE your amlodipine, and metoprolol with a sip of water.      Before Your Surgery      Call your surgeon if there is any change in your health. This includes signs of a cold or flu (such as a sore throat, runny nose, cough, rash or fever).    Do not smoke, drink alcohol or take over the counter medicine (unless your surgeon or primary care doctor tells you to) for the 24 hours before and after surgery.    If you take prescribed drugs: Follow your doctor s orders about which medicines to take and which to stop until after surgery.    Eating and drinking prior to surgery: follow the instructions from your surgeon    Take a shower or bath the night before surgery. Use the soap your surgeon gave you to gently clean your skin. If you do not have soap from your surgeon, use your regular soap. Do not shave or scrub the surgery site.  Wear clean pajamas and have clean sheets on your bed.           Follow-ups after your visit        Your next 10 appointments already scheduled     Jul 24, 2017   Procedure with Jet Loja DO   [facility] ([facility])    [address]  [place] [address]   [phone]           [facility] is located at [address] [place]              Future tests that were  ordered for you today     Open Future Orders        Priority Expected Expires Ordered    Potassium Routine 7/20/2017 8/31/2017 7/20/2017    Glucose Routine 7/20/2017 8/31/2017 7/20/2017    Hemoglobin Routine 7/20/2017 8/31/2017 7/20/2017    Platelet count Routine 7/20/2017 8/31/2017 7/20/2017            Who to contact     If you have questions or need follow up information about today's clinic visit or your schedule please contact [facility] directly at [phone].  Normal or non-critical lab and imaging results will be communicated to you by [employer], letter or phone within 4 business days after the clinic has received the results. If you do not hear from us within 7 days, please contact the clinic through [employer] or phone. If you have a critical or abnormal lab result, we will notify you by phone as soon as possible.  Submit refill requests through [employer] or call your pharmacy and they will forward the refill request to us. Please allow 3 business days for your refill to be completed.          Additional Information About Your Visit        UT[employer]hart Information     [employer] gives you secure access to your electronic health record. If you see a primary care provider, you can also send messages to your care team and make appointments. If you have questions, please call your primary care clinic.  If you do not have a primary care provider, please call [phone] and they will assist you.        Care EveryWhere ID     This is your Care EveryWhere ID. This could be used by other organizations to access your [place] medical records  [id number]        Your Vitals Were     Pulse Temperature Height Pulse Oximetry BMI (Body Mass Index)       84 97.6  F (36.4  C) (Oral) 6' (1.829 m) 94% 53.27 kg/m2        Blood Pressure from Last 3 Encounters:   07/21/17 110/76   04/04/17 114/72   11/16/16 124/80    Weight from Last 3 Encounters:   07/21/17 (!) 392 lb 12.8 oz (178.2 kg)   04/04/17 (!) 400 lb 8 oz  (181.7 kg)   11/16/16 (!) 405 lb 14.4 oz (184.1 kg)              We Performed the Following     Basic metabolic panel     Hemoglobin        Primary Care Provider Office Phone # Fax #    Guevara Billy [phone]       [facility] [address] [facility] [address]        Equal Access to Services     DARIA AGUIRRE : Hadii aad ku hadasho Soomaali, waaxda luqadaha, qaybta kaalmada adeegyada, waxay idiin hayaan adeeg kharash la'aan ah. So [facility] [phone].    ATENCIÓN: Si habla español, tiene a philippe disposición servicios gratuitos de asistencia lingüística. Llame al [phone].    We comply with applicable federal civil rights laws and [place] laws. We do not discriminate on the basis of race, color, national origin, age, disability sex, sexual orientation or gender identity.            Thank you!     Thank you for choosing [facility]  for your care. Our goal is always to provide you with excellent care. Hearing back from our patients is one way we can continue to improve our services. Please take a few minutes to complete the written survey that you may receive in the mail after your visit with us. Thank you!             Your Updated Medication List - Protect others around you: Learn how to safely use, store and throw away your medicines at www.disposemymeds.org.          This list is accurate as of: 7/21/17  2:35 PM.  Always use your most recent med list.                   Brand Name Dispense Instructions for use Diagnosis    amLODIPine 5 MG tablet    NORVASC    90 tablet    Take 1 tablet (5 mg) by mouth daily    Essential hypertension, benign       furosemide 20 MG tablet    LASIX    180 tablet    Take 1 tablet (20 mg) by mouth 2 times daily    Essential hypertension, benign       HYDROcodone-acetaminophen 5-325 MG per tablet    NORCO    8 tablet    Take 1-2 tablets by mouth every 4 hours as needed for moderate to severe pain        lisinopril 40 MG tablet     PRINIVIL/ZESTRIL    90 tablet    Take 1 tablet (40 mg) by mouth daily    Chronic atrial fibrillation (H), Essential hypertension, benign       metoprolol 200 MG 24 hr tablet    TOPROL XL    180 tablet    Take 1 tablet (200 mg) by mouth 2 times daily Verified with patient this is the XL    Chronic atrial fibrillation (H), Essential hypertension, benign       multivitamin, therapeutic with minerals Tabs tablet      Take 1 tablet by mouth daily        potassium chloride SA 20 MEQ CR tablet    potassium chloride    180 tablet    Take 1 tablet (20 mEq) by mouth 2 times daily    Essential hypertension, benign       warfarin 5 MG tablet    COUMADIN    135 tablet    Take 1.5 tablets (7.5 mg) by mouth daily Except 1 tablet on Tues as directed by the anticoagulation clinic, an appt needed with primary MD pror to refills    Chronic atrial fibrillation (H)

## 2017-07-21 NOTE — PROGRESS NOTES
[address] [address]  [phone]  Dept: [phone]    PRE-OP EVALUATION:  Today's date: 2017    Angelito Espinoza (: 1950) presents for pre-operative evaluation assessment as requested by Dr. Loja.  He requires evaluation and anesthesia risk assessment prior to undergoing surgery/procedure for treatment of Colonoscopy  .  Proposed procedure:     Date of Surgery/ Procedure:   Time of Surgery/ Procedure: 7 am   Hospital/Surgical Facility: [facility]     Primary Physician: Guevara Billy  Type of Anesthesia Anticipated: General    Patient has a Health Care Directive or Living Will:  NO    1. NO - Do you have a history of heart attack, stroke, stent, bypass or surgery on an artery in the head, neck, heart or legs?  2. YES - DO YOU EVER HAVE ANY PAIN OR DISCOMFORT IN YOUR CHEST? Mild, infrequent- No heart issues other than  Hypertension and a fib, some intermittent chest wall pain   3. NO - Do you have a history of  Heart Failure?  4. YES - ARE YOUR TROUBLED BY SHORTNESS OF BREATH WHEN WALKING ON THE LEVEL, UP A SLIGHT HILL OR AT NIGHT? Walking up a hill - deconditioning   5. NO - Do you currently have a cold, bronchitis or other respiratory infection?  6. NO - Do you have a cough, shortness of breath or wheezing?  7. NO - Do you sometimes get pains in the calves of your legs when you walk?  8. NO - Do you or anyone in your family have previous history of blood clots?  9. NO - Do you or does anyone in your family have a serious bleeding problem such as prolonged bleeding following surgeries or cuts?  10. NO - Have you ever had problems with anemia or been told to take iron pills?  11. NO - Have you had any abnormal blood loss such as black, tarry or bloody stools, or abnormal vaginal bleeding?  12. NO - Have you ever had a blood transfusion?  13. NO - Have you or any of your relatives ever had problems with anesthesia?  14. YES -  DO YOU HAVE SLEEP APNEA, EXCESSIVE SNORING OR DAYTIME DROWSINESS? Sleep apnea   15. NO - Do you have any prosthetic heart valves?  16. NO - Do you have prosthetic joints?  17. NO - Is there any chance that you may be pregnant?        HPI:                                                      Brief HPI related to upcoming procedure:colonoscopy for screening       See problem list for active medical problems.  Problems all longstanding and stable, except as noted/documented.  See ROS for pertinent symptoms related to these conditions.                                                                                                  .  HYPERTENSION - Patient has longstanding history of mod-severe HTN , currently denies any symptoms referable to elevated blood pressure. Specifically denies chest pain, palpitations, dyspnea, orthopnea, PND or peripheral edema. Blood pressure readings have been in normal range. Current medication regimen is as listed below. Patient denies any side effects of medication.                                                                                                                                                                                          .  HYPERLIPIDEMIA - Patient has a long history of Hyperlipidemia  No medication for treatment with recent fair control.   SLEEP PROBLEM - Patient has a longstanding history of sleep apnea of moderate severity.                                                                                                                                        .    MEDICAL HISTORY:                                                    Patient Active Problem List    Diagnosis Date Noted     Essential hypertension, benign 04/04/2017     Priority: Medium     Hyperlipidemia LDL goal <130 04/04/2017     Priority: Medium     ACP (advance care planning) 03/18/2016     Priority: Medium     Advance Care Planning 3/18/2016: ACP Review of Chart / Resources Provided:  Reviewed  chart for advance care plan.  Angelito Espinoza has no plan or code status on file. Discussed available resources and provided with information. Pt declined form at this time.  Added by Telma Rivers             Personal history of tobacco use, presenting hazards to health: 10- 43y/o @  03/18/2016     Priority: Medium     Stasis dermatitis of both legs 03/18/2016     Priority: Medium     Edema, unspecified edema of bilat legs  03/18/2016     Priority: Medium     Long-term (current) use of anticoagulants [Z79.01] 01/18/2016     Priority: Medium     Morbid obesity due to excess calories (HCC) BMI 50-55 11/19/2015     Priority: Medium     Chronic atrial fibrillation (H) 09/23/2015     Priority: Medium     Lung nodules < 4mm bibasilar/ abdom CT  9-15 09/23/2015     Priority: Medium     Cellulitis 09/18/2014     Priority: Medium      Past Medical History:   Diagnosis Date     Atrial fibrillation (H)      Hypertension      Lymph edema      Past Surgical History:   Procedure Laterality Date     CHOLECYSTECTOMY       ENT SURGERY       EYE SURGERY       Current Outpatient Prescriptions   Medication Sig Dispense Refill     amLODIPine (NORVASC) 5 MG tablet Take 1 tablet (5 mg) by mouth daily 90 tablet 3     furosemide (LASIX) 20 MG tablet Take 1 tablet (20 mg) by mouth 2 times daily 180 tablet 3     lisinopril (PRINIVIL/ZESTRIL) 40 MG tablet Take 1 tablet (40 mg) by mouth daily 90 tablet 3     metoprolol (TOPROL XL) 200 MG 24 hr tablet Take 1 tablet (200 mg) by mouth 2 times daily Verified with patient this is the  tablet 3     potassium chloride SA (POTASSIUM CHLORIDE) 20 MEQ CR tablet Take 1 tablet (20 mEq) by mouth 2 times daily 180 tablet 3     HYDROcodone-acetaminophen (NORCO) 5-325 MG per tablet Take 1-2 tablets by mouth every 4 hours as needed for moderate to severe pain 8 tablet 0     multivitamin, therapeutic with minerals (THERA-VIT-M) TABS Take 1 tablet by mouth daily       warfarin (COUMADIN) 5 MG tablet Take  1.5 tablets (7.5 mg) by mouth daily Except 1 tablet on Tues as directed by the anticoagulation clinic, an appt needed with primary MD pror to refills (Patient not taking: Reported on 7/21/2017) 135 tablet 3     OTC products: None, except as noted above and no recent use of OTC ASA, NSAIDS or Steroids    No Known Allergies   Latex Allergy: NO    Social History   Substance Use Topics     Smoking status: Former Smoker     Quit date: 12/8/1994     Smokeless tobacco: Never Used     Alcohol use No     History   Drug Use No       REVIEW OF SYSTEMS:                                                    C: NEGATIVE for fever, chills, change in weight  INTEGUMENTARY/SKIN: NEGATIVE for worrisome rashes, moles or lesions  EYES: NEGATIVE for vision changes or irritation  E/M: NEGATIVE for ear, mouth and throat problems  R: NEGATIVE for significant cough or SOB  CV: NEGATIVE for chest pain, palpitations or peripheral edema  GI: NEGATIVE for nausea, abdominal pain, heartburn, or change in bowel habits  MUSCULOSKELETAL: NEGATIVE for significant arthralgias or myalgia  NEURO: NEGATIVE for weakness, dizziness or paresthesias  ENDOCRINE: NEGATIVE for temperature intolerance, skin/hair changes  HEME/ALLERGY/IMMUNE: NEGATIVE for bleeding problems  PSYCHIATRIC: NEGATIVE for changes in mood or affect  ROS otherwise negative    EXAM:                                                    /76 (BP Location: Left arm, Patient Position: Chair, Cuff Size: Adult Large)  Pulse 84  Temp 97.6  F (36.4  C) (Oral)  Ht 6' (1.829 m)  Wt (!) 392 lb 12.8 oz (178.2 kg)  SpO2 94%  BMI 53.27 kg/m2  GENERAL APPEARANCE: healthy, alert and no distress  HENT: ear canals and TM's normal and nose and mouth without ulcers or lesions  RESP: lungs clear to auscultation - no rales, rhonchi or wheezes  CV: regular rate and rhythm, normal S1 S2, no S3 or S4 and no murmur, click or rub   ABDOMEN: soft, nontender, no HSM or masses and bowel sounds normal  MS:  extremities normal- no gross deformities noted  SKIN: no suspicious lesions or rashes  NEURO: Normal strength and tone, sensory exam grossly normal, mentation intact and speech normal  PSYCH: mentation appears normal and affect normal/bright    DIAGNOSTICS:                                                      EKG: Not indicated due to non-vascular surgery and low risk of event for colonoscopy   Labs Resulted Today: nonfasting   Results for orders placed or performed in visit on 07/21/17   Basic metabolic panel   Result Value Ref Range    Sodium 142 133 - 144 mmol/L    Potassium 4.2 3.4 - 5.3 mmol/L    Chloride 107 94 - 109 mmol/L    Carbon Dioxide 29 20 - 32 mmol/L    Anion Gap 6 3 - 14 mmol/L    Glucose 109 (H) 70 - 99 mg/dL    Urea Nitrogen 21 7 - 30 mg/dL    Creatinine 1.16 0.66 - 1.25 mg/dL    GFR Estimate 63 >60 mL/min/1.7m2    GFR Estimate If Black 76 >60 mL/min/1.7m2    Calcium 9.0 8.5 - 10.1 mg/dL   Hemoglobin   Result Value Ref Range    Hemoglobin 15.3 13.3 - 17.7 g/dL       Recent Labs   Lab Test 07/06/17 05/03/17 04/04/17   1608   03/18/16   1639   09/13/15   2020  09/25/14   0650   HGB   --    --    --    --    --    --   15.6  11.8*   PLT   --    --    --    --    --    --   174  193   INR  2.4  2.5*   --    < >   --    < >  2.16*  3.00*   NA   --    --   142   --   139   --   139  145*   POTASSIUM   --    --   Unsatisfactory specimen - hemolyzed   Unsatisfactory specimen - hemolyzed     --   4.0   --   3.9  3.8   CR   --    --   1.16   --   1.00   --   1.01  1.49*   A1C   --    --    --    --   5.5   --    --    --     < > = values in this interval not displayed.        IMPRESSION:                                                    Reason for surgery/procedure: colonoscopy for screening   Diagnosis/reason for consult:A fib, long term use of anticoag, HTN, hyperlipidemia     The proposed surgical procedure is considered LOW risk.    REVISED CARDIAC RISK INDEX  The patient has the following serious  cardiovascular risks for perioperative complications such as (MI, PE, VFib and 3  AV Block):  No serious cardiac risks  INTERPRETATION: 0 risks: Class I (very low risk - 0.4% complication rate)    The patient has the following additional risks for perioperative complications:  No identified additional risks      ICD-10-CM    1. Preop general physical exam Z01.818 Basic metabolic panel     Hemoglobin   2. Special screening for malignant neoplasms, colon Z12.11 Basic metabolic panel     Hemoglobin   3. Chronic atrial fibrillation (H) I48.2 Basic metabolic panel     Hemoglobin   4. Long-term (current) use of anticoagulants [Z79.01] Z79.01    5. Essential hypertension, benign I10 Hemoglobin   6. Hyperlipidemia LDL goal <130 E78.5        RECOMMENDATIONS:                                                        Cardiovascular Risk  Patient is already on a Beta Blocker. Continue Betablocker therapy after surgery, using Beta blocker order set as necessary for NPO status.      Pulmonary Risk  Continue c pap          --Patient is to take all scheduled medications on the day of surgery EXCEPT for modifications listed below.    Anticoagulant or Antiplatelet Medication Use  WARFARIN: Thromboembolic risk is low (e.g. Single VTE >12 months ago, A fib and CHADS<3 without prior CVA/TIA) and warfarin may be discontinued in the perioperative period        ACE Inhibitor or Angiotensin Receptor Blocker (ARB) Use  Ace inhibitor or Angiotensin Receptor Blocker (ARB) and should HOLD this medication for the 24 hours prior to surgery.  Will hold lasix and potassium am of procedure.        APPROVAL GIVEN to proceed with proposed procedure, without further diagnostic evaluation       Signed Electronically by: Rosa Velez PA-C    Copy of this evaluation report is provided to requesting physician.    [facility] Preop Guidelines

## 2017-07-21 NOTE — NURSING NOTE
Chief Complaint   Patient presents with     Pre-Op Exam     colonoscopy 07/24       Initial /76 (BP Location: Left arm, Patient Position: Chair, Cuff Size: Adult Large)  Pulse 84  Temp 97.6  F (36.4  C) (Oral)  Ht 6' (1.829 m)  Wt (!) 392 lb 12.8 oz (178.2 kg)  SpO2 94%  BMI 53.27 kg/m2 Estimated body mass index is 53.27 kg/(m^2) as calculated from the following:    Height as of this encounter: 6' (1.829 m).    Weight as of this encounter: 392 lb 12.8 oz (178.2 kg).  Medication Reconciliation: complete

## 2017-07-24 ENCOUNTER — ANESTHESIA EVENT (OUTPATIENT)
Dept: GASTROENTEROLOGY | Facility: CLINIC | Age: 67
End: 2017-07-24
Payer: COMMERCIAL

## 2017-07-24 ENCOUNTER — HOSPITAL ENCOUNTER (OUTPATIENT)
Facility: CLINIC | Age: 67
Discharge: HOME | End: 2017-07-24
Attending: INTERNAL MEDICINE | Admitting: INTERNAL MEDICINE
Payer: COMMERCIAL

## 2017-07-24 ENCOUNTER — ANESTHESIA (OUTPATIENT)
Dept: GASTROENTEROLOGY | Facility: CLINIC | Age: 67
End: 2017-07-24
Payer: COMMERCIAL

## 2017-07-24 VITALS
WEIGHT: 315 LBS | BODY MASS INDEX: 42.66 KG/M2 | SYSTOLIC BLOOD PRESSURE: 114 MMHG | HEIGHT: 72 IN | RESPIRATION RATE: 11 BRPM | OXYGEN SATURATION: 91 % | DIASTOLIC BLOOD PRESSURE: 90 MMHG

## 2017-07-24 LAB
COLONOSCOPY: NORMAL
INR PPP: 1.2 (ref 0.86–1.14)

## 2017-07-24 PROCEDURE — 45381 COLONOSCOPY SUBMUCOUS NJX: CPT | Performed by: INTERNAL MEDICINE

## 2017-07-24 PROCEDURE — 88305 TISSUE EXAM BY PATHOLOGIST: CPT | Performed by: INTERNAL MEDICINE

## 2017-07-24 PROCEDURE — 93005 ELECTROCARDIOGRAM TRACING: CPT

## 2017-07-24 PROCEDURE — 25000125 ZZHC RX 250: Performed by: NURSE ANESTHETIST, CERTIFIED REGISTERED

## 2017-07-24 PROCEDURE — 37000009 ZZH ANESTHESIA TECHNICAL FEE, EACH ADDTL 15 MIN: Performed by: INTERNAL MEDICINE

## 2017-07-24 PROCEDURE — 85610 PROTHROMBIN TIME: CPT | Performed by: INTERNAL MEDICINE

## 2017-07-24 PROCEDURE — 93010 ELECTROCARDIOGRAM REPORT: CPT | Performed by: INTERNAL MEDICINE

## 2017-07-24 PROCEDURE — 37000008 ZZH ANESTHESIA TECHNICAL FEE, 1ST 30 MIN: Performed by: INTERNAL MEDICINE

## 2017-07-24 PROCEDURE — 25000128 H RX IP 250 OP 636: Performed by: NURSE ANESTHETIST, CERTIFIED REGISTERED

## 2017-07-24 PROCEDURE — 40000010 ZZH STATISTIC ANES STAT CODE-CRNA PER MINUTE: Performed by: INTERNAL MEDICINE

## 2017-07-24 PROCEDURE — 88305 TISSUE EXAM BY PATHOLOGIST: CPT | Mod: 26 | Performed by: INTERNAL MEDICINE

## 2017-07-24 PROCEDURE — 27210582 ZZH DEVICE CLIP RESOLUTION, EACH: Performed by: INTERNAL MEDICINE

## 2017-07-24 PROCEDURE — 45385 COLONOSCOPY W/LESION REMOVAL: CPT | Performed by: INTERNAL MEDICINE

## 2017-07-24 PROCEDURE — 40000065 ZZH STATISTIC EKG NON-CHARGEABLE

## 2017-07-24 PROCEDURE — 45382 COLONOSCOPY W/CONTROL BLEED: CPT | Performed by: INTERNAL MEDICINE

## 2017-07-24 PROCEDURE — 25000125 ZZHC RX 250: Performed by: INTERNAL MEDICINE

## 2017-07-24 PROCEDURE — 36415 COLL VENOUS BLD VENIPUNCTURE: CPT | Performed by: INTERNAL MEDICINE

## 2017-07-24 RX ORDER — SODIUM CHLORIDE, SODIUM LACTATE, POTASSIUM CHLORIDE, CALCIUM CHLORIDE 600; 310; 30; 20 MG/100ML; MG/100ML; MG/100ML; MG/100ML
INJECTION, SOLUTION INTRAVENOUS CONTINUOUS PRN
Status: DISCONTINUED | OUTPATIENT
Start: 2017-07-24 | End: 2017-07-24

## 2017-07-24 RX ORDER — METOPROLOL TARTRATE 1 MG/ML
INJECTION, SOLUTION INTRAVENOUS PRN
Status: DISCONTINUED | OUTPATIENT
Start: 2017-07-24 | End: 2017-07-24

## 2017-07-24 RX ORDER — PROPOFOL 10 MG/ML
INJECTION, EMULSION INTRAVENOUS CONTINUOUS PRN
Status: DISCONTINUED | OUTPATIENT
Start: 2017-07-24 | End: 2017-07-24

## 2017-07-24 RX ADMIN — DEXMEDETOMIDINE HYDROCHLORIDE 8 MCG: 100 INJECTION, SOLUTION INTRAVENOUS at 11:16

## 2017-07-24 RX ADMIN — MIDAZOLAM HYDROCHLORIDE 2 MG: 1 INJECTION, SOLUTION INTRAMUSCULAR; INTRAVENOUS at 10:55

## 2017-07-24 RX ADMIN — PROPOFOL 150 MCG/KG/MIN: 10 INJECTION, EMULSION INTRAVENOUS at 10:55

## 2017-07-24 RX ADMIN — METOPROLOL TARTRATE 2.5 MG: 5 INJECTION INTRAVENOUS at 10:45

## 2017-07-24 RX ADMIN — METOPROLOL TARTRATE 2.5 MG: 5 INJECTION INTRAVENOUS at 10:35

## 2017-07-24 RX ADMIN — SODIUM CHLORIDE, POTASSIUM CHLORIDE, SODIUM LACTATE AND CALCIUM CHLORIDE: 600; 310; 30; 20 INJECTION, SOLUTION INTRAVENOUS at 10:25

## 2017-07-24 RX ADMIN — DEXMEDETOMIDINE HYDROCHLORIDE 8 MCG: 100 INJECTION, SOLUTION INTRAVENOUS at 11:04

## 2017-07-24 RX ADMIN — DEXMEDETOMIDINE HYDROCHLORIDE 4 MCG: 100 INJECTION, SOLUTION INTRAVENOUS at 11:09

## 2017-07-24 RX ADMIN — DEXMEDETOMIDINE HYDROCHLORIDE 8 MCG: 100 INJECTION, SOLUTION INTRAVENOUS at 10:55

## 2017-07-24 RX ADMIN — PHENYLEPHRINE HYDROCHLORIDE 100 MCG: 10 INJECTION, SOLUTION INTRAMUSCULAR; INTRAVENOUS; SUBCUTANEOUS at 11:27

## 2017-07-24 ASSESSMENT — LIFESTYLE VARIABLES: TOBACCO_USE: 1

## 2017-07-24 ASSESSMENT — ENCOUNTER SYMPTOMS
SEIZURES: 0
DYSRHYTHMIAS: 1

## 2017-07-24 NOTE — ANESTHESIA POSTPROCEDURE EVALUATION
Patient: Angelito Espinoza    Procedure(s):  COLONOSCOPY (MAC)  - Wound Class: II-Clean Contaminated   - Wound Class: II-Clean Contaminated   - Wound Class: II-Clean Contaminated    Diagnosis:SCREENING  Diagnosis Additional Information: No value filed.    Anesthesia Type:  MAC    Note:  Anesthesia Post Evaluation    Patient location during evaluation: PACU  Patient participation: Able to fully participate in evaluation  Level of consciousness: awake and alert  Pain management: satisfactory to patient  Airway patency: patent  Cardiovascular status: hemodynamically stable  Respiratory status: acceptable and unassisted  Hydration status: balanced  PONV: none     Anesthetic complications: None          Last vitals:  Vitals:    07/24/17 1200 07/24/17 1210 07/24/17 1220   BP: 100/80 102/73 114/90   Resp: 11 10 11   SpO2: 97% 97% 91%         Electronically Signed By: Po Estes MD  July 24, 2017  2:29 PM

## 2017-07-24 NOTE — ANESTHESIA CARE TRANSFER NOTE
Patient: Angelito Espinoza    Procedure(s):  COLONOSCOPY (MAC)  - Wound Class: II-Clean Contaminated    Diagnosis: SCREENING  Diagnosis Additional Information: No value filed.    Anesthesia Type:   MAC     Note:  Airway :Face Mask  Patient transferred to:PACU  Comments: VSS      Vitals: (Last set prior to Anesthesia Care Transfer)    CRNA VITALS  7/24/2017 1115 - 7/24/2017 1149      7/24/2017             Resp Rate (set): 10                Electronically Signed By: NAHUM Samaniego CRNA  July 24, 2017  11:49 AM

## 2017-07-25 LAB — COPATH REPORT: NORMAL

## 2017-07-28 LAB — INTERPRETATION ECG - MUSE: NORMAL

## 2017-08-10 ENCOUNTER — TELEPHONE (OUTPATIENT)
Dept: INTERNAL MEDICINE | Facility: CLINIC | Age: 67
End: 2017-08-10

## 2017-08-10 NOTE — TELEPHONE ENCOUNTER
Patient called INR line and states he had an INR out of town and it was 2.1 on 8/8/17 . He also states that he had a colonoscopy 7/24 and was told to hold his Warfarin for 5 days before and 3 days after .Carmel Gomez RN

## 2017-09-19 ENCOUNTER — OFFICE VISIT (OUTPATIENT)
Dept: INTERNAL MEDICINE | Facility: CLINIC | Age: 67
End: 2017-09-19
Payer: COMMERCIAL

## 2017-09-19 ENCOUNTER — ANTICOAGULATION THERAPY VISIT (OUTPATIENT)
Dept: ANTICOAGULATION | Facility: CLINIC | Age: 67
End: 2017-09-19
Payer: COMMERCIAL

## 2017-09-19 VITALS
DIASTOLIC BLOOD PRESSURE: 80 MMHG | TEMPERATURE: 98.2 F | OXYGEN SATURATION: 96 % | SYSTOLIC BLOOD PRESSURE: 110 MMHG | WEIGHT: 315 LBS | HEART RATE: 97 BPM | BODY MASS INDEX: 42.66 KG/M2 | HEIGHT: 72 IN

## 2017-09-19 DIAGNOSIS — S60.222D: Primary | ICD-10-CM

## 2017-09-19 LAB — INR POINT OF CARE: 3.5 (ref 0.86–1.14)

## 2017-09-19 PROCEDURE — 85610 PROTHROMBIN TIME: CPT | Mod: QW

## 2017-09-19 PROCEDURE — 99213 OFFICE O/P EST LOW 20 MIN: CPT | Performed by: PHYSICIAN ASSISTANT

## 2017-09-19 PROCEDURE — 36416 COLLJ CAPILLARY BLOOD SPEC: CPT

## 2017-09-19 NOTE — PROGRESS NOTES
ANTICOAGULATION FOLLOW-UP CLINIC VISIT    Patient Name:  Angelito Espinoza  Date:  9/19/2017  Contact Type:  Face to Face    SUBJECTIVE:     Patient Findings     Positives Other complaints (Pt is seeing MD today to get a nother look to assure pt would not require an antibiotic.), Inflammation (Pt had lt had injury on top of hand 9/7/17. Pt hand is swollen red, INR in ER 4.2, told by ER MD to hold warfarin 3 days. Pt travels back and forth to [facility]. )           OBJECTIVE    INR Protime   Date Value Ref Range Status   09/19/2017 3.5 (A) 0.86 - 1.14 Final       ASSESSMENT / PLAN  INR assessment SUPRA    Recheck INR In: 6 DAYS    INR Location Clinic      Anticoagulation Summary as of 9/19/2017     INR goal 2.0-3.0   Today's INR 3.5!   Maintenance plan 5 mg (5 mg x 1) on Tue; 7.5 mg (5 mg x 1.5) all other days   Full instructions 9/19: Hold; 9/20: 5 mg; Otherwise 5 mg on Tue; 7.5 mg all other days   Weekly total 50 mg   Plan last modified Sofya Luther, RN (11/15/2016)   Next INR check 9/25/2017   Target end date     Indications   Long-term (current) use of anticoagulants [Z79.01] [Z79.01]  Chronic atrial fibrillation (H) [I48.2]         Anticoagulation Episode Summary     INR check location     Preferred lab     Send INR reminders to  [facility]    Comments             See the Encounter Report to view Anticoagulation Flowsheet and Dosing Calendar (Go to Encounters tab in chart review, and find the Anticoagulation Therapy Visit)        Sofya Luther RN

## 2017-09-19 NOTE — PROGRESS NOTES
SUBJECTIVE:   Angelito Espinoza is a 67 year old male who presents to clinic today for the following health issues:      Pt states he had a hand injury and went to [facility]  emergency room. He had hit his L hand on a wall. They did xray, and it wasn't broken. Today he is here because it is swollen,purple to reddish, bruising x1.5 wks.     Patient seen in [facility] clinic this am. They were concerned about the appearance of the left hand.  Patient hit the left hand - very hard against the door jam. Initially with some swelling and bruising that day. The next day was worse and went to ER.   xrays done- negative for fracture. Treatment with percocet for pain and has been elevating the hand as able.      INR today still out of range.- [facility] has adjusted dosing.   -------------------------------------    Problem list and histories reviewed & adjusted, as indicated.  Additional history: as documented    Labs reviewed in EPIC    Reviewed and updated as needed this visit by clinical staff  Tobacco  Allergies       Reviewed and updated as needed this visit by Provider  Allergies  Meds         ROS:  Constitutional, HEENT, cardiovascular, pulmonary, gi and gu systems are negative, except as otherwise noted.      OBJECTIVE:   /80 (BP Location: Left arm, Patient Position: Chair, Cuff Size: Adult Large)  Pulse 97  Temp 98.2  F (36.8  C) (Oral)  Ht 6' (1.829 m)  Wt (!) 388 lb 3.2 oz (176.1 kg)  SpO2 96%  BMI 52.65 kg/m2  Body mass index is 52.65 kg/(m^2).  GENERAL: healthy, alert and no distress  RESP: lungs clear to auscultation - no rales, rhonchi or wheezes  CV: regular rates and rhythm  MS: left dorsal hand.   Swelling and large hematoma noted.   No warm, no bright redness noted.  Purple to dark red bruising. - bruising traveling to finger and up the inner arm now too from the area of the original injury.     Diagnostic Test Results:  none     ASSESSMENT/PLAN:             1. Traumatic hematoma of hand, left,  subsequent encounter  Reassurance warm compress to cool compress  Continue to monitor  If Bright red/ hot then recheck in clinic.               Rosa Velez PA-C  [facility]

## 2017-09-19 NOTE — MR AVS SNAPSHOT
Angelito Espinoza   9/19/2017 11:45 AM   Anticoagulation Therapy Visit    Description:  67 year old male   Provider:   ANTICOAGULATION CLINIC   Department:   Anti Coagulation           INR as of 9/19/2017     Today's INR 3.5!      Anticoagulation Summary as of 9/19/2017     INR goal 2.0-3.0   Today's INR 3.5!   Full instructions 9/19: Hold; 9/20: 5 mg; Otherwise 5 mg on Tue; 7.5 mg all other days   Next INR check 9/25/2017    Indications   Long-term (current) use of anticoagulants [Z79.01] [Z79.01]  Chronic atrial fibrillation (H) [I48.2]         Your next Anticoagulation Clinic appointment(s)     Sep 25, 2017 11:45 AM CDT   Anticoagulation Visit with  ANTICOAGULATION CLINIC   [facility]    [address] [address] [phone]              Contact Numbers     [facility]  Please call  [phone] to cancel and/or reschedule your appointment   Please call  [phone] with any problems or questions regarding your therapy.        September 2017 Details    Sun Mon Tue Wed Thu Fri Sat          1               2                 3               4               5               6               7               8               9                 10               11               12               13               14               15               16                 17               18               19      Hold   See details      20      5 mg         21      7.5 mg         22      7.5 mg         23      7.5 mg           24      7.5 mg         25            26               27               28               29               30                Date Details   09/19 This INR check       Date of next INR:  9/25/2017         How to take your warfarin dose     To take:  5 mg Take 1 of the 5 mg tablets.    To take:  7.5 mg Take 1.5 of the 5 mg tablets.    Hold Do not take your warfarin dose. See the Details table to the right for  additional instructions.

## 2017-09-19 NOTE — MR AVS SNAPSHOT
After Visit Summary   9/19/2017    Angelito Espinoza    MRN: [id number]           Patient Information     Date Of Birth          1950        Visit Information        Provider Department      9/19/2017 1:20 PM Rosa Velez PA-C [facility]        Today's Diagnoses     Traumatic hematoma of hand, left, subsequent encounter    -  1       Follow-ups after your visit        Your next 10 appointments already scheduled     Sep 25, 2017 11:45 AM CDT   Anticoagulation Visit with [facility]   [facility] ([facility])    [address] [place] [id number] [phone]            Sep 26, 2017  2:40 PM CDT   Office Visit with Guevara Billy MD   [facility] ([facility])    [address] [place] [phone]           Bring a current list of meds and any records pertaining to this visit. For Physicals, please bring immunization records and any forms needing to be filled out. Please arrive 10 minutes early to complete paperwork.              Who to contact     If you have questions or need follow up information about today's clinic visit or your schedule please contact [facility] directly at [phone].  Normal or non-critical lab and imaging results will be communicated to you by MyChart, letter or phone within 4 business days after the clinic has received the results. If you do not hear from us within 7 days, please contact the clinic through MyChart or phone. If you have a critical or abnormal lab result, we will notify you by phone as soon as possible.  Submit refill requests through [employer] or call your pharmacy and they will forward the refill request to us. Please allow 3 business days for your refill to be completed.          Additional Information About Your Visit        [facility]  Information     [employer] gives you secure access to your electronic health record. If you see a primary care provider, you can also send messages to your care team and make appointments. If you have questions, please call your primary care clinic.  If you do not have a primary care provider, please call [phone] and they will assist you.        Care EveryWhere ID     This is your Care EveryWhere ID. This could be used by other organizations to access your [facility] medical records  [id number]        Your Vitals Were     Pulse Temperature Height Pulse Oximetry BMI (Body Mass Index)       97 98.2  F (36.8  C) (Oral) 6' (1.829 m) 96% 52.65 kg/m2        Blood Pressure from Last 3 Encounters:   09/19/17 110/80   07/24/17 114/90   07/21/17 110/76    Weight from Last 3 Encounters:   09/19/17 (!) 388 lb 3.2 oz (176.1 kg)   07/24/17 (!) 392 lb (177.8 kg)   07/21/17 (!) 392 lb 12.8 oz (178.2 kg)              Today, you had the following     No orders found for display       Primary Care Provider Office Phone # Fax #    Guevara Billy [phone]       [address] [facility] [phone]        Equal Access to Services     DARIA AGUIRRE : Hadii aad ku hadasho Soomaali, waaxda luqadaha, qaybta kaalmada adeegyada, waxay idiin hayaan maci chery . So [facility] [phone].    ATENCIÓN: Si habla español, tiene a philippe disposición servicios gratuitos de asistencia lingüística. Llame al [phone].    We comply with applicable federal civil rights laws and [place] laws. We do not discriminate on the basis of race, color, national origin, age, disability sex, sexual orientation or gender identity.            Thank you!     Thank you for choosing [facility]  for your care. Our goal is always to provide you with excellent care. Hearing back from our patients is one way we can continue to improve our services. Please take a few minutes to complete the written survey that you may  receive in the mail after your visit with us. Thank you!             Your Updated Medication List - Protect others around you: Learn how to safely use, store and throw away your medicines at www.disposemymeds.org.          This list is accurate as of: 9/19/17  2:07 PM.  Always use your most recent med list.                   Brand Name Dispense Instructions for use Diagnosis    amLODIPine 5 MG tablet    NORVASC    90 tablet    Take 1 tablet (5 mg) by mouth daily    Essential hypertension, benign       furosemide 20 MG tablet    LASIX    180 tablet    Take 1 tablet (20 mg) by mouth 2 times daily    Essential hypertension, benign       HYDROcodone-acetaminophen 5-325 MG per tablet    NORCO    8 tablet    Take 1-2 tablets by mouth every 4 hours as needed for moderate to severe pain        lisinopril 40 MG tablet    PRINIVIL/ZESTRIL    90 tablet    Take 1 tablet (40 mg) by mouth daily    Chronic atrial fibrillation (H), Essential hypertension, benign       metoprolol 200 MG 24 hr tablet    TOPROL XL    180 tablet    Take 1 tablet (200 mg) by mouth 2 times daily Verified with patient this is the XL    Chronic atrial fibrillation (H), Essential hypertension, benign       multivitamin, therapeutic with minerals Tabs tablet      Take 1 tablet by mouth daily        potassium chloride SA 20 MEQ CR tablet    potassium chloride    180 tablet    Take 1 tablet (20 mEq) by mouth 2 times daily    Essential hypertension, benign       warfarin 5 MG tablet    COUMADIN    135 tablet    Take 1.5 tablets (7.5 mg) by mouth daily Except 1 tablet on Tues as directed by the anticoagulation clinic, an appt needed with primary MD pror to refills    Chronic atrial fibrillation (H)

## 2017-09-19 NOTE — NURSING NOTE
Chief Complaint   Patient presents with     Cellulitis     x1.5 wks R hand       Initial /80 (BP Location: Left arm, Patient Position: Chair, Cuff Size: Adult Large)  Pulse 97  Temp 98.2  F (36.8  C) (Oral)  Ht 6' (1.829 m)  Wt (!) 388 lb 3.2 oz (176.1 kg)  SpO2 96%  BMI 52.65 kg/m2 Estimated body mass index is 52.65 kg/(m^2) as calculated from the following:    Height as of this encounter: 6' (1.829 m).    Weight as of this encounter: 388 lb 3.2 oz (176.1 kg).  Medication Reconciliation: complete

## 2017-09-25 ENCOUNTER — ANTICOAGULATION THERAPY VISIT (OUTPATIENT)
Dept: ANTICOAGULATION | Facility: CLINIC | Age: 67
End: 2017-09-25
Payer: COMMERCIAL

## 2017-09-25 DIAGNOSIS — Z79.01 LONG-TERM (CURRENT) USE OF ANTICOAGULANTS: ICD-10-CM

## 2017-09-25 DIAGNOSIS — I48.20 CHRONIC ATRIAL FIBRILLATION (H): ICD-10-CM

## 2017-09-25 DIAGNOSIS — Z23 NEED FOR PROPHYLACTIC VACCINATION AND INOCULATION AGAINST INFLUENZA: Primary | ICD-10-CM

## 2017-09-25 LAB — INR POINT OF CARE: 1.6 (ref 0.86–1.14)

## 2017-09-25 PROCEDURE — 85610 PROTHROMBIN TIME: CPT | Mod: QW

## 2017-09-25 PROCEDURE — G0008 ADMIN INFLUENZA VIRUS VAC: HCPCS

## 2017-09-25 PROCEDURE — 90662 IIV NO PRSV INCREASED AG IM: CPT

## 2017-09-25 PROCEDURE — 36416 COLLJ CAPILLARY BLOOD SPEC: CPT

## 2017-09-25 NOTE — MR AVS SNAPSHOT
Angelito Espinoza   9/25/2017 11:45 AM   Anticoagulation Therapy Visit    Description:  67 year old male   Provider:   ANTICOAGULATION CLINIC   Department:   Anti Coagulation           INR as of 9/25/2017     Today's INR 1.6!      Anticoagulation Summary as of 9/25/2017     INR goal 2.0-3.0   Today's INR 1.6!   Full instructions 9/27: 5 mg; 9/28: 5 mg; 9/29: 5 mg; 9/30: 5 mg; 10/1: 5 mg; 10/2: 5 mg; 10/4: 5 mg; 10/5: 5 mg; Otherwise 5 mg on Tue; 7.5 mg all other days   Next INR check 10/6/2017    Indications   Long-term (current) use of anticoagulants [Z79.01] [Z79.01]  Chronic atrial fibrillation (H) [I48.2]         Your next Anticoagulation Clinic appointment(s)     Oct 06, 2017 11:45 AM CDT   Anticoagulation Visit with  [facility]ATION CLINIC   [facility]    [address] [place] [id number] [phone]              Contact Numbers     [facility]  Please call  [phone] to cancel and/or reschedule your appointment   Please call  [phone] with any problems or questions regarding your therapy.        September 2017 Details    Sun Mon Tue Wed Thu Fri Sat          1               2                 3               4               5               6               7               8               9                 10               11               12               13               14               15               16                 17               18               19               20               21               22               23                 24               25      7.5 mg   See details      26      5 mg         27      5 mg         28      5 mg         29      5 mg         30      5 mg          Date Details   09/25 This INR check               How to take your warfarin dose     To take:  5 mg Take 1 of the 5 mg tablets.    To take:  7.5 mg Take 1.5 of the 5 mg tablets.           October 2017 Details    Sun Mon  Tue Wed Thu Fri Sat     1      5 mg         2      5 mg         3      5 mg         4      5 mg         5      5 mg         6            7                 8               9               10               11               12               13               14                 15               16               17               18               19               20               21                 22               23               24               25               26               27               28                 29               30               31                    Date Details   No additional details    Date of next INR:  10/6/2017         How to take your warfarin dose     To take:  5 mg Take 1 of the 5 mg tablets.    To take:  7.5 mg Take 1.5 of the 5 mg tablets.

## 2017-09-25 NOTE — PROGRESS NOTES
ANTICOAGULATION FOLLOW-UP CLINIC VISIT    Patient Name:  Angelito Espinoza  Date:  9/25/2017  Contact Type:  Face to Face    SUBJECTIVE:     Patient Findings     Positives Missed doses (pt took only 2.5mg a day due to swelling he had in his hand )    Comments Pt was taking 5mg Tues and then 5mg every other Thurs up until last week when he was seen in the INR clinic            OBJECTIVE    INR Protime   Date Value Ref Range Status   09/25/2017 1.6 (A) 0.86 - 1.14 Final       ASSESSMENT / PLAN  No question data found.  Anticoagulation Summary as of 9/25/2017     INR goal 2.0-3.0   Today's INR 1.6!   Maintenance plan 5 mg (5 mg x 1) on Tue; 7.5 mg (5 mg x 1.5) all other days   Full instructions 9/27: 5 mg; 9/28: 5 mg; 9/29: 5 mg; 9/30: 5 mg; 10/1: 5 mg; 10/2: 5 mg; 10/4: 5 mg; 10/5: 5 mg; Otherwise 5 mg on Tue; 7.5 mg all other days   Weekly total 50 mg   Plan last modified Sofya Luther RN (11/15/2016)   Next INR check 10/6/2017   Target end date     Indications   Long-term (current) use of anticoagulants [Z79.01] [Z79.01]  Chronic atrial fibrillation (H) [I48.2]         Anticoagulation Episode Summary     INR check location     Preferred lab     Send INR reminders to  [facility]    Comments             See the Encounter Report to view Anticoagulation Flowsheet and Dosing Calendar (Go to Encounters tab in chart review, and find the Anticoagulation Therapy Visit)    Dosage adjustment made based on physician directed care plan.    Morenita Brink RN               Injectable Influenza Immunization Documentation    1.  Is the person to be vaccinated sick today?   No    2. Does the person to be vaccinated have an allergy to a component   of the vaccine?   No    3. Has the person to be vaccinated ever had a serious reaction   to influenza vaccine in the past?   No    4. Has the person to be vaccinated ever had Guillain-Barré syndrome?   No    Form completed by Morenita Brink RN

## 2017-10-06 ENCOUNTER — ANTICOAGULATION THERAPY VISIT (OUTPATIENT)
Dept: ANTICOAGULATION | Facility: CLINIC | Age: 67
End: 2017-10-06
Payer: COMMERCIAL

## 2017-10-06 LAB — INR POINT OF CARE: 1.6 (ref 0.86–1.14)

## 2017-10-06 PROCEDURE — 85610 PROTHROMBIN TIME: CPT | Mod: QW

## 2017-10-06 PROCEDURE — 36416 COLLJ CAPILLARY BLOOD SPEC: CPT

## 2017-10-06 NOTE — PROGRESS NOTES
ANTICOAGULATION FOLLOW-UP CLINIC VISIT    Patient Name:  Angelito Espinoza  Date:  10/6/2017  Contact Type:  Face to Face    SUBJECTIVE:     Patient Findings     Positives Inflammation (Pt lt hand mluch improved, still slightly swollen, minimal redness. Pt flying back to [place] tomorrow, for 1 week)           OBJECTIVE    INR Protime   Date Value Ref Range Status   10/06/2017 1.6 (A) 0.86 - 1.14 Final       ASSESSMENT / PLAN  INR assessment SUB    Recheck INR In: 2 WEEKS    INR Location Clinic      Anticoagulation Summary as of 10/6/2017     INR goal 2.0-3.0   Today's INR 1.6!   Maintenance plan 5 mg (5 mg x 1) on Tue; 7.5 mg (5 mg x 1.5) all other days   Full instructions 5 mg on Tue; 7.5 mg all other days   Weekly total 50 mg   No change documented Sofya Luther, RN   Plan last modified Sofya Luther, RN (11/15/2016)   Next INR check 10/20/2017   Target end date     Indications   Long-term (current) use of anticoagulants [Z79.01] [Z79.01]  Chronic atrial fibrillation (H) [I48.2]         Anticoagulation Episode Summary     INR check location     Preferred lab     Send INR reminders to  [facility]    Comments             See the Encounter Report to view Anticoagulation Flowsheet and Dosing Calendar (Go to Encounters tab in chart review, and find the Anticoagulation Therapy Visit)        Sofya Luther, RN

## 2017-10-06 NOTE — MR AVS SNAPSHOT
Angelito Espinoza   10/6/2017 11:45 AM   Anticoagulation Therapy Visit    Description:  67 year old male   Provider:   ANTICOAGULATION CLINIC   Department:   Anti Coagulation           INR as of 10/6/2017     Today's INR 1.6!      Anticoagulation Summary as of 10/6/2017     INR goal 2.0-3.0   Today's INR 1.6!   Full instructions 5 mg on Tue; 7.5 mg all other days   Next INR check 10/20/2017    Indications   Long-term (current) use of anticoagulants [Z79.01] [Z79.01]  Chronic atrial fibrillation (H) [I48.2]         Your next Anticoagulation Clinic appointment(s)     Oct 20, 2017 12:00 PM CDT   Anticoagulation Visit with  ANTICOAGULATION CLINIC   [facility] ([facility])    [address] [place] [id number] [phone]              Contact Numbers     [facility]  Please call  [phone] to cancel and/or reschedule your appointment   Please call  [phone] with any problems or questions regarding your therapy.        October 2017 Details    Sun Mon Tue Wed Thu Fri Sat     1               2               3               4               5               6      7.5 mg   See details      7      7.5 mg           8      7.5 mg         9      7.5 mg         10      5 mg         11      7.5 mg         12      7.5 mg         13      7.5 mg         14      7.5 mg           15      7.5 mg         16      7.5 mg         17      5 mg         18      7.5 mg         19      7.5 mg         20            21                 22               23               24               25               26               27               28                 29               30               31                    Date Details   10/06 This INR check       Date of next INR:  10/20/2017         How to take your warfarin dose     To take:  5 mg Take 1 of the 5 mg tablets.    To take:  7.5 mg Take 1.5 of the 5 mg tablets.

## 2017-10-20 ENCOUNTER — ANTICOAGULATION THERAPY VISIT (OUTPATIENT)
Dept: ANTICOAGULATION | Facility: CLINIC | Age: 67
End: 2017-10-20
Payer: COMMERCIAL

## 2017-10-20 LAB — INR POINT OF CARE: 3 (ref 0.86–1.14)

## 2017-10-20 PROCEDURE — 36416 COLLJ CAPILLARY BLOOD SPEC: CPT

## 2017-10-20 PROCEDURE — 85610 PROTHROMBIN TIME: CPT | Mod: QW

## 2017-10-20 NOTE — PROGRESS NOTES
ANTICOAGULATION FOLLOW-UP CLINIC VISIT    Patient Name:  Angelito Espinoza  Date:  10/20/2017  Contact Type:  Face to Face    SUBJECTIVE:     Patient Findings     Positives No Problem Findings           OBJECTIVE    INR Protime   Date Value Ref Range Status   10/20/2017 3.0 (A) 0.86 - 1.14 Final       ASSESSMENT / PLAN  No question data found.  Anticoagulation Summary as of 10/20/2017     INR goal 2.0-3.0   Today's INR 3.0   Maintenance plan 5 mg (5 mg x 1) on Tue; 7.5 mg (5 mg x 1.5) all other days   Full instructions 5 mg on Tue; 7.5 mg all other days   Weekly total 50 mg   No change documented Sofya Luther, RN   Plan last modified Sofya Luther, RN (11/15/2016)   Next INR check 11/10/2017   Target end date     Indications   Long-term (current) use of anticoagulants [Z79.01] [Z79.01]  Chronic atrial fibrillation (H) [I48.2]         Anticoagulation Episode Summary     INR check location     Preferred lab     Send INR reminders to [facility]    Comments             See the Encounter Report to view Anticoagulation Flowsheet and Dosing Calendar (Go to Encounters tab in chart review, and find the Anticoagulation Therapy Visit)        Sofya Luther, RN

## 2017-10-20 NOTE — MR AVS SNAPSHOT
Angelito Espinoza   10/20/2017 12:00 PM   Anticoagulation Therapy Visit    Description:  67 year old male   Provider:   ANTICOAGULATION CLINIC   Department:   Anti Coagulation           INR as of 10/20/2017     Today's INR 3.0      Anticoagulation Summary as of 10/20/2017     INR goal 2.0-3.0   Today's INR 3.0   Full instructions 5 mg on Tue; 7.5 mg all other days   Next INR check 11/10/2017    Indications   Long-term (current) use of anticoagulants [Z79.01] [Z79.01]  Chronic atrial fibrillation (H) [I48.2]         Your next Anticoagulation Clinic appointment(s)     Nov 10, 2017 12:00 PM CST   Anticoagulation Visit with  [facility]ULATION CLINIC   [facility]    [address] [place] [id number] [phone]              Contact Numbers     [facility]  Please call  [phone] to cancel and/or reschedule your appointment   Please call  [phone] with any problems or questions regarding your therapy.        October 2017 Details    Sun Mon Tue Wed Thu Fri Sat     1               2               3               4               5               6               7                 8               9               10               11               12               13               14                 15               16               17               18               19               20      7.5 mg   See details      21      7.5 mg           22      7.5 mg         23      7.5 mg         24      5 mg         25      7.5 mg         26      7.5 mg         27      7.5 mg         28      7.5 mg           29      7.5 mg         30      7.5 mg         31      5 mg              Date Details   10/20 This INR check               How to take your warfarin dose     To take:  5 mg Take 1 of the 5 mg tablets.    To take:  7.5 mg Take 1.5 of the 5 mg tablets.           November 2017 Details    Sun Mon Tue Wed Thu Fri Sat        1      7.5 mg          2      7.5 mg         3      7.5 mg         4      7.5 mg           5      7.5 mg         6      7.5 mg         7      5 mg         8      7.5 mg         9      7.5 mg         10            11                 12               13               14               15               16               17               18                 19               20               21               22               23               24               25                 26               27               28               29               30                  Date Details   No additional details    Date of next INR:  11/10/2017         How to take your warfarin dose     To take:  5 mg Take 1 of the 5 mg tablets.    To take:  7.5 mg Take 1.5 of the 5 mg tablets.

## 2017-11-10 ENCOUNTER — ANTICOAGULATION THERAPY VISIT (OUTPATIENT)
Dept: ANTICOAGULATION | Facility: CLINIC | Age: 67
End: 2017-11-10
Payer: COMMERCIAL

## 2017-11-10 LAB — INR POINT OF CARE: 2.6 (ref 0.86–1.14)

## 2017-11-10 PROCEDURE — 85610 PROTHROMBIN TIME: CPT | Mod: QW

## 2017-11-10 PROCEDURE — 36416 COLLJ CAPILLARY BLOOD SPEC: CPT

## 2017-11-10 NOTE — PROGRESS NOTES
ANTICOAGULATION FOLLOW-UP CLINIC VISIT    Patient Name:  Angelito Espinoza  Date:  11/10/2017  Contact Type:  Face to Face    SUBJECTIVE:     Patient Findings     Positives No Problem Findings           OBJECTIVE    INR Protime   Date Value Ref Range Status   11/10/2017 2.6 (A) 0.86 - 1.14 Final       ASSESSMENT / PLAN  INR assessment THER    Recheck INR In: 4 WEEKS    INR Location Clinic      Anticoagulation Summary as of 11/10/2017     INR goal 2.0-3.0   Today's INR 2.6   Maintenance plan 5 mg (5 mg x 1) on Tue; 7.5 mg (5 mg x 1.5) all other days   Full instructions 5 mg on Tue; 7.5 mg all other days   Weekly total 50 mg   Plan last modified Sofya Luther RN (11/15/2016)   Next INR check 12/8/2017   Target end date     Indications   Long-term (current) use of anticoagulants [Z79.01] [Z79.01]  Chronic atrial fibrillation (H) [I48.2]         Anticoagulation Episode Summary     INR check location     Preferred lab     Send INR reminders to [facility]    Comments             See the Encounter Report to view Anticoagulation Flowsheet and Dosing Calendar (Go to Encounters tab in chart review, and find the Anticoagulation Therapy Visit)        Sofya Luther, RN

## 2017-11-10 NOTE — MR AVS SNAPSHOT
Angelito Espinoza   11/10/2017 12:00 PM   Anticoagulation Therapy Visit    Description:  67 year old male   Provider:   ANTICOAGULATION CLINIC   Department:   Anti Coagulation           INR as of 11/10/2017     Today's INR 2.6      Anticoagulation Summary as of 11/10/2017     INR goal 2.0-3.0   Today's INR 2.6   Full instructions 5 mg on Tue; 7.5 mg all other days   Next INR check 12/8/2017    Indications   Long-term (current) use of anticoagulants [Z79.01] [Z79.01]  Chronic atrial fibrillation (H) [I48.2]         Your next Anticoagulation Clinic appointment(s)     Dec 08, 2017 12:00 PM CST   Anticoagulation Visit with  ANTICOAGULATION CLINIC   [facility] ([facility])    [address] [place] [id number] [phone]              Contact Numbers     [facility]  Please call  [phone] to cancel and/or reschedule your appointment   Please call  [phone] with any problems or questions regarding your therapy.        November 2017 Details    Sun Mon Tue Wed Thu Fri Sat        1               2               3               4                 5               6               7               8               9               10      7.5 mg   See details      11      7.5 mg           12      7.5 mg         13      7.5 mg         14      5 mg         15      7.5 mg         16      7.5 mg         17      7.5 mg         18      7.5 mg           19      7.5 mg         20      7.5 mg         21      5 mg         22      7.5 mg         23      7.5 mg         24      7.5 mg         25      7.5 mg           26      7.5 mg         27      7.5 mg         28      5 mg         29      7.5 mg         30      7.5 mg            Date Details   11/10 This INR check               How to take your warfarin dose     To take:  5 mg Take 1 of the 5 mg tablets.    To take:  7.5 mg Take 1.5 of the 5 mg tablets.           December 2017 Details    Sun Mon Tue Wed Thu  Fri Sat          1      7.5 mg         2      7.5 mg           3      7.5 mg         4      7.5 mg         5      5 mg         6      7.5 mg         7      7.5 mg         8            9                 10               11               12               13               14               15               16                 17               18               19               20               21               22               23                 24               25               26               27               28               29               30                 31                      Date Details   No additional details    Date of next INR:  12/8/2017         How to take your warfarin dose     To take:  5 mg Take 1 of the 5 mg tablets.    To take:  7.5 mg Take 1.5 of the 5 mg tablets.

## 2017-12-08 ENCOUNTER — ANTICOAGULATION THERAPY VISIT (OUTPATIENT)
Dept: ANTICOAGULATION | Facility: CLINIC | Age: 67
End: 2017-12-08
Payer: COMMERCIAL

## 2017-12-08 LAB — INR POINT OF CARE: 3.2 (ref 0.86–1.14)

## 2017-12-08 PROCEDURE — 36416 COLLJ CAPILLARY BLOOD SPEC: CPT

## 2017-12-08 PROCEDURE — 85610 PROTHROMBIN TIME: CPT | Mod: QW

## 2017-12-08 NOTE — MR AVS SNAPSHOT
Angelito Espinoza   12/8/2017 12:00 PM   Anticoagulation Therapy Visit    Description:  67 year old male   Provider:   ANTICOAGULATION CLINIC   Department:   Anti Coagulation           INR as of 12/8/2017     Today's INR 3.2!      Anticoagulation Summary as of 12/8/2017     INR goal 2.0-3.0   Today's INR 3.2!   Full instructions 12/8: 5 mg; Otherwise 5 mg on Tue; 7.5 mg all other days   Next INR check 1/5/2018    Indications   Long-term (current) use of anticoagulants [Z79.01] [Z79.01]  Chronic atrial fibrillation (H) [I48.2]         Your next Anticoagulation Clinic appointment(s)     Jan 03, 2018 12:00 PM CST   Anticoagulation Visit with  ANTICOAGULATION CLINIC   [facility]    [address] [place] [address] [phone]              Contact Numbers     [facility]  Please call  [phone] to cancel and/or reschedule your appointment   Please call  [phone] with any problems or questions regarding your therapy.        December 2017 Details    Sun Mon Tue Wed Thu Fri Sat          1               2                 3               4               5               6               7               8      5 mg   See details      9      7.5 mg           10      7.5 mg         11      7.5 mg         12      5 mg         13      7.5 mg         14      7.5 mg         15      7.5 mg         16      7.5 mg           17      7.5 mg         18      7.5 mg         19      5 mg         20      7.5 mg         21      7.5 mg         22      7.5 mg         23      7.5 mg           24      7.5 mg         25      7.5 mg         26      5 mg         27      7.5 mg         28      7.5 mg         29      7.5 mg         30      7.5 mg           31      7.5 mg                Date Details   12/08 This INR check               How to take your warfarin dose     To take:  5 mg Take 1 of the 5 mg tablets.    To take:  7.5 mg Take 1.5 of the 5 mg  tablets.           January 2018 Details    Sun Mon Tue Wed Thu Fri Sat      1      7.5 mg         2      5 mg         3      7.5 mg         4      7.5 mg         5            6                 7               8               9               10               11               12               13                 14               15               16               17               18               19               20                 21               22               23               24               25               26               27                 28               29               30               31                   Date Details   No additional details    Date of next INR:  1/5/2018         How to take your warfarin dose     To take:  5 mg Take 1 of the 5 mg tablets.    To take:  7.5 mg Take 1.5 of the 5 mg tablets.

## 2017-12-08 NOTE — PROGRESS NOTES
ANTICOAGULATION FOLLOW-UP CLINIC VISIT    Patient Name:  Angelito Espinoza  Date:  12/8/2017  Contact Type:  Face to Face    SUBJECTIVE:     Patient Findings     Positives Unexplained INR or factor level change           OBJECTIVE    INR Protime   Date Value Ref Range Status   12/08/2017 3.2 (A) 0.86 - 1.14 Final       ASSESSMENT / PLAN  INR assessment SUPRA    Recheck INR In: 4 WEEKS    INR Location Clinic      Anticoagulation Summary as of 12/8/2017     INR goal 2.0-3.0   Today's INR 3.2!   Maintenance plan 5 mg (5 mg x 1) on Tue; 7.5 mg (5 mg x 1.5) all other days   Full instructions 12/8: 5 mg; Otherwise 5 mg on Tue; 7.5 mg all other days   Weekly total 50 mg   Plan last modified Sofya Luther RN (11/15/2016)   Next INR check 1/5/2018   Target end date     Indications   Long-term (current) use of anticoagulants [Z79.01] [Z79.01]  Chronic atrial fibrillation (H) [I48.2]         Anticoagulation Episode Summary     INR check location     Preferred lab     Send INR reminders to [facility]    Comments             See the Encounter Report to view Anticoagulation Flowsheet and Dosing Calendar (Go to Encounters tab in chart review, and find the Anticoagulation Therapy Visit)    Dosage adjustment made based on physician directed care plan.    Sofya Luther RN

## 2017-12-21 ENCOUNTER — OFFICE VISIT (OUTPATIENT)
Dept: INTERNAL MEDICINE | Facility: CLINIC | Age: 67
End: 2017-12-21
Payer: COMMERCIAL

## 2017-12-21 VITALS
SYSTOLIC BLOOD PRESSURE: 134 MMHG | WEIGHT: 315 LBS | DIASTOLIC BLOOD PRESSURE: 80 MMHG | HEART RATE: 78 BPM | OXYGEN SATURATION: 95 % | BODY MASS INDEX: 53.54 KG/M2 | TEMPERATURE: 98.5 F

## 2017-12-21 DIAGNOSIS — I10 ESSENTIAL HYPERTENSION, BENIGN: ICD-10-CM

## 2017-12-21 DIAGNOSIS — E66.01 MORBID OBESITY DUE TO EXCESS CALORIES (H): ICD-10-CM

## 2017-12-21 DIAGNOSIS — Z12.11 COLON CANCER SCREENING: Primary | ICD-10-CM

## 2017-12-21 PROCEDURE — 99213 OFFICE O/P EST LOW 20 MIN: CPT | Performed by: INTERNAL MEDICINE

## 2017-12-21 NOTE — MR AVS SNAPSHOT
After Visit Summary   12/21/2017    Angelito Espinoza    MRN: [id number]           Patient Information     Date Of Birth          1950        Visit Information        Provider Department      12/21/2017 11:40 AM Guevara Billy MD [facility]        Today's Diagnoses     Colon cancer screening    -  1       Follow-ups after your visit        Additional Services     GASTROENTEROLOGY ADULT REF PROCEDURE ONLY       Last Lab Result: Creatinine (mg/dL)       Date                     Value                 07/21/2017               1.16             ----------  There is no height or weight on file to calculate BMI.     Needed:  No  Language:  English    Patient will be contacted to schedule procedure.     Please be aware that coverage of these services is subject to the terms and limitations of your health insurance plan.  Call member services at your health plan with any benefit or coverage questions.  Any procedures must be performed at a [place] facility OR coordinated by your clinic's referral office.    Please bring the following with you to your appointment:    (1) Any X-Rays, CTs or MRIs which have been performed.  Contact the facility where they were done to arrange for  prior to your scheduled appointment.    (2) List of current medications   (3) This referral request   (4) Any documents/labs given to you for this referral                  Your next 10 appointments already scheduled     Jan 03, 2018 12:00 PM CST   Anticoagulation Visit with  ANTICOAGULATION CLINIC   [facility] ([facility])    [address] [place] [address] [phone]              Who to contact     If you have questions or need follow up information about today's clinic visit or your schedule please contact [facility] directly at [phone].  Normal or non-critical lab and imaging results  will be communicated to you by [employer]gr[employer], letter or phone within 4 business days after the clinic has received the results. If you do not hear from us within 7 days, please contact the clinic through [employer] or phone. If you have a critical or abnormal lab result, we will notify you by phone as soon as possible.  Submit refill requests through [employer] or call your pharmacy and they will forward the refill request to us. Please allow 3 business days for your refill to be completed.          Additional Information About Your Visit        [employer] Information     [employer] gives you secure access to your electronic health record. If you see a primary care provider, you can also send messages to your care team and make appointments. If you have questions, please call your primary care clinic.  If you do not have a primary care provider, please call [phone] and they will assist you.        Care EveryWhere ID     This is your Care EveryWhere ID. This could be used by other organizations to access your [facility] medical records  [id number]        Your Vitals Were     Pulse Temperature Pulse Oximetry BMI (Body Mass Index)          78 98.5  F (36.9  C) (Oral) 95% 53.54 kg/m2         Blood Pressure from Last 3 Encounters:   12/21/17 134/80   09/19/17 110/80   07/24/17 114/90    Weight from Last 3 Encounters:   12/21/17 (!) 394 lb 12.8 oz (179.1 kg)   09/19/17 (!) 388 lb 3.2 oz (176.1 kg)   07/24/17 (!) 392 lb (177.8 kg)              We Performed the Following     GASTROENTEROLOGY ADULT REF PROCEDURE ONLY        Primary Care Provider Office Phone # Fax #    Guevara Billy [phone]       [address] [facility] [address]        Equal Access to Services     DARIA AGUIRRE : Hadii steve Osborn, waaxda luqadaha, qaybta kaalmada zhou, eugenia wilkinson. So [facility] [phone].    ATENCIÓN: Si habla español, tiene a philippe disposición servicios gratuitos de asistencia  lingüística. Lalita al [phone].    We comply with applicable federal civil rights laws and [place] laws. We do not discriminate on the basis of race, color, national origin, age, disability, sex, sexual orientation, or gender identity.            Thank you!     Thank you for choosing [facility]  for your care. Our goal is always to provide you with excellent care. Hearing back from our patients is one way we can continue to improve our services. Please take a few minutes to complete the written survey that you may receive in the mail after your visit with us. Thank you!             Your Updated Medication List - Protect others around you: Learn how to safely use, store and throw away your medicines at www.disposemymeds.org.          This list is accurate as of: 12/21/17 12:00 PM.  Always use your most recent med list.                   Brand Name Dispense Instructions for use Diagnosis    amLODIPine 5 MG tablet    NORVASC    90 tablet    Take 1 tablet (5 mg) by mouth daily    Essential hypertension, benign       furosemide 20 MG tablet    LASIX    180 tablet    Take 1 tablet (20 mg) by mouth 2 times daily    Essential hypertension, benign       HYDROcodone-acetaminophen 5-325 MG per tablet    NORCO    8 tablet    Take 1-2 tablets by mouth every 4 hours as needed for moderate to severe pain        lisinopril 40 MG tablet    PRINIVIL/ZESTRIL    90 tablet    Take 1 tablet (40 mg) by mouth daily    Chronic atrial fibrillation (H), Essential hypertension, benign       metoprolol 200 MG 24 hr tablet    TOPROL XL    180 tablet    Take 1 tablet (200 mg) by mouth 2 times daily Verified with patient this is the XL    Chronic atrial fibrillation (H), Essential hypertension, benign       multivitamin, therapeutic with minerals Tabs tablet      Take 1 tablet by mouth daily        potassium chloride SA 20 MEQ CR tablet    KLOR-CON    180 tablet    Take 1 tablet (20 mEq) by mouth 2 times daily     Essential hypertension, benign       warfarin 5 MG tablet    COUMADIN    135 tablet    Take 1.5 tablets (7.5 mg) by mouth daily Except 1 tablet on Tues as directed by the anticoagulation clinic, an appt needed with primary MD pror to refills    Chronic atrial fibrillation (H)

## 2017-12-21 NOTE — PROGRESS NOTES
SUBJECTIVE:   Angelito Espinoza is a 67 year old male who presents to clinic today for the following health issues:    Discuss results of 7/24/17 colonoscopy   I reviewed the results of the patient's recent colonoscopy done in July 2017 at which time several sessile polyps were noted.  The patient was informed of the pathology results and his recommendation was to have a repeat colonoscopy in 6 months which is what the patient was aware of.    Problem list and histories reviewed & adjusted, as indicated.  Additional history: as documented    Patient Active Problem List   Diagnosis     Chronic atrial fibrillation (H)     Lung nodules < 4mm bibasilar/ abdom CT  9-15     Morbid obesity due to excess calories (HCC) BMI 50-55     Long-term (current) use of anticoagulants [Z79.01]     ACP (advance care planning)     Personal history of tobacco use, presenting hazards to health: 10- 41y/o @      Stasis dermatitis of both legs     Edema, unspecified edema of bilat legs      Essential hypertension, benign     Hyperlipidemia LDL goal <130     Past Surgical History:   Procedure Laterality Date     CHOLECYSTECTOMY       ENT SURGERY       EYE SURGERY         Social History   Substance Use Topics     Smoking status: Former Smoker     Quit date: 12/8/1994     Smokeless tobacco: Never Used     Alcohol use 0.0 oz/week     0 Standard drinks or equivalent per week      Comment: 2-3 times per year      Family History   Problem Relation Age of Onset     Unknown/Adopted Mother      Unknown/Adopted Father      Colon Cancer Sister          Current Outpatient Prescriptions   Medication Sig Dispense Refill     amLODIPine (NORVASC) 5 MG tablet Take 1 tablet (5 mg) by mouth daily 90 tablet 3     furosemide (LASIX) 20 MG tablet Take 1 tablet (20 mg) by mouth 2 times daily 180 tablet 3     lisinopril (PRINIVIL/ZESTRIL) 40 MG tablet Take 1 tablet (40 mg) by mouth daily 90 tablet 3     metoprolol (TOPROL XL) 200 MG 24 hr tablet Take 1 tablet (200  mg) by mouth 2 times daily Verified with patient this is the  tablet 3     potassium chloride SA (POTASSIUM CHLORIDE) 20 MEQ CR tablet Take 1 tablet (20 mEq) by mouth 2 times daily 180 tablet 3     warfarin (COUMADIN) 5 MG tablet Take 1.5 tablets (7.5 mg) by mouth daily Except 1 tablet on Tues as directed by the anticoagulation clinic, an appt needed with primary MD pror to refills 135 tablet 3     multivitamin, therapeutic with minerals (THERA-VIT-M) TABS Take 1 tablet by mouth daily       HYDROcodone-acetaminophen (NORCO) 5-325 MG per tablet Take 1-2 tablets by mouth every 4 hours as needed for moderate to severe pain 8 tablet 0     No Known Allergies  BP Readings from Last 3 Encounters:   12/21/17 134/80   09/19/17 110/80   07/24/17 114/90    Wt Readings from Last 3 Encounters:   12/21/17 (!) 394 lb 12.8 oz (179.1 kg)   09/19/17 (!) 388 lb 3.2 oz (176.1 kg)   07/24/17 (!) 392 lb (177.8 kg)              Reviewed and updated as needed this visit by clinical staff       Reviewed and updated as needed this visit by Provider         ROS:  C: NEGATIVE for fever, chills, change in weight  E/M: NEGATIVE for ear, mouth and throat problems  R: NEGATIVE for significant cough or SOB  CV: NEGATIVE for chest pain, palpitations or peripheral edema  GI: NEGATIVE for nausea, abdominal pain, heartburn, or change in bowel habits  : NEGATIVE for frequency, dysuria, or hematuria  M: NEGATIVE for significant arthralgias or myalgia  H: NEGATIVE for bleeding problems  P: NEGATIVE for changes in mood or affect    OBJECTIVE:                                                    /80  Pulse 78  Temp 98.5  F (36.9  C) (Oral)  Wt (!) 394 lb 12.8 oz (179.1 kg)  SpO2 95%  BMI 53.54 kg/m2  Body mass index is 53.54 kg/(m^2).  GENERAL: healthy, alert and no distress  ABDOMEN: obese  PSYCH: Alert and oriented times 3; speech- coherent , normal rate and volume; able to articulate logical thoughts, able to abstract reason, no  tangential thoughts, no hallucinations or delusions, affect- normal       ASSESSMENT/PLAN:                                                    1. Colon cancer screening  ADVISED COLONOSCOPY FOR ROUTINE PREVENTATIVE CARE.  - GASTROENTEROLOGY ADULT REF PROCEDURE ONLY    2. Morbid obesity due to excess calories (HCC) BMI 50-55  Discussed the benefits of ongoing weight loss    3. Essential hypertension, benign  Stable on therapy with no changes in medication    Guevara Billy MD  [facility]

## 2017-12-21 NOTE — NURSING NOTE
Chief Complaint   Patient presents with     Results       Initial /80  Pulse 78  Temp 98.5  F (36.9  C) (Oral)  Wt (!) 394 lb 12.8 oz (179.1 kg)  SpO2 95%  BMI 53.54 kg/m2 Estimated body mass index is 53.54 kg/(m^2) as calculated from the following:    Height as of 9/19/17: 6' (1.829 m).    Weight as of this encounter: 394 lb 12.8 oz (179.1 kg).  Medication Reconciliation: complete   Albina Delgado, CMA

## 2018-01-03 ENCOUNTER — ANTICOAGULATION THERAPY VISIT (OUTPATIENT)
Dept: ANTICOAGULATION | Facility: CLINIC | Age: 68
End: 2018-01-03
Payer: COMMERCIAL

## 2018-01-03 DIAGNOSIS — I48.20 CHRONIC ATRIAL FIBRILLATION (H): ICD-10-CM

## 2018-01-03 DIAGNOSIS — Z79.01 LONG-TERM (CURRENT) USE OF ANTICOAGULANTS: ICD-10-CM

## 2018-01-03 LAB — INR POINT OF CARE: 2.5 (ref 0.86–1.14)

## 2018-01-03 PROCEDURE — 85610 PROTHROMBIN TIME: CPT | Mod: QW

## 2018-01-03 PROCEDURE — 99207 ZZC NO CHARGE NURSE ONLY: CPT

## 2018-01-03 PROCEDURE — 36416 COLLJ CAPILLARY BLOOD SPEC: CPT

## 2018-01-03 NOTE — MR AVS SNAPSHOT
Angelito Espinoza   1/3/2018 12:00 PM   Anticoagulation Therapy Visit    Description:  67 year old male   Provider:   ANTICOAGULATION CLINIC   Department:   Anti Coagulation           INR as of 1/3/2018     Today's INR 2.5      Anticoagulation Summary as of 1/3/2018     INR goal 2.0-3.0   Today's INR 2.5   Full instructions 5 mg on Tue; 7.5 mg all other days   Next INR check 1/31/2018    Indications   Long-term (current) use of anticoagulants [Z79.01] [Z79.01]  Chronic atrial fibrillation (H) [I48.2]         Your next Anticoagulation Clinic appointment(s)     Jan 31, 2018 11:00 AM CST   Anticoagulation Visit with  ANTICOAGULATION CLINIC   [facility] ([facility])    [address] [place] [address] [phone]              Contact Numbers     [facility]  Please call  [phone] to cancel and/or reschedule your appointment   Please call  [phone] with any problems or questions regarding your therapy.        January 2018 Details    Sun Mon Tue Wed Thu Fri Sat      1               2               3      7.5 mg   See details      4      7.5 mg         5      7.5 mg         6      7.5 mg           7      7.5 mg         8      7.5 mg         9      5 mg         10      7.5 mg         11      7.5 mg         12      7.5 mg         13      7.5 mg           14      7.5 mg         15      7.5 mg         16      5 mg         17      7.5 mg         18      7.5 mg         19      7.5 mg         20      7.5 mg           21      7.5 mg         22      7.5 mg         23      5 mg         24      7.5 mg         25      7.5 mg         26      7.5 mg         27      7.5 mg           28      7.5 mg         29      7.5 mg         30      5 mg         31                Date Details   01/03 This INR check       Date of next INR:  1/31/2018         How to take your warfarin dose     To take:  5 mg Take 1 of the 5 mg tablets.    To take:  7.5 mg Take 1.5 of  the 5 mg tablets.

## 2018-01-03 NOTE — PROGRESS NOTES
ANTICOAGULATION FOLLOW-UP CLINIC VISIT    Patient Name:  Angelito Espinoza  Date:  1/3/2018  Contact Type:  Face to Face    SUBJECTIVE:     Patient Findings     Positives No Problem Findings           OBJECTIVE    INR Protime   Date Value Ref Range Status   01/03/2018 2.5 (A) 0.86 - 1.14 Final       ASSESSMENT / PLAN  INR assessment THER    Recheck INR In: 4 WEEKS    INR Location Clinic      Anticoagulation Summary as of 1/3/2018     INR goal 2.0-3.0   Today's INR 2.5   Maintenance plan 5 mg (5 mg x 1) on Tue; 7.5 mg (5 mg x 1.5) all other days   Full instructions 5 mg on Tue; 7.5 mg all other days   Weekly total 50 mg   No change documented Nneka Gomez, RN   Plan last modified Sofya Luther RN (11/15/2016)   Next INR check 1/31/2018   Target end date     Indications   Long-term (current) use of anticoagulants [Z79.01] [Z79.01]  Chronic atrial fibrillation (H) [I48.2]         Anticoagulation Episode Summary     INR check location     Preferred lab     Send INR reminders to [facility]    Comments             See the Encounter Report to view Anticoagulation Flowsheet and Dosing Calendar (Go to Encounters tab in chart review, and find the Anticoagulation Therapy Visit)    Dosage adjustment made based on physician directed care plan.    Nneka Gomez RN

## 2018-01-04 ENCOUNTER — TELEPHONE (OUTPATIENT)
Dept: INTERNAL MEDICINE | Facility: CLINIC | Age: 68
End: 2018-01-04

## 2018-01-04 NOTE — TELEPHONE ENCOUNTER
Call [facility] and gave orders and they will schedule pt and notify him of recommendations .Carmel Gomez RN

## 2018-01-04 NOTE — TELEPHONE ENCOUNTER
Appears as only a. Fibb. thus no bridging needed.  Hold coumadin as advised restart after procedure, INR 48 hrs later.

## 2018-01-04 NOTE — TELEPHONE ENCOUNTER
[facility]ON[facility] is calling asking for Holding and bridging orders for the warfarin (COUMADIN) 5 MG tablet he is taking. They plan on scheduling a colonoscopy, but need these orders first.

## 2018-01-31 ENCOUNTER — ANTICOAGULATION THERAPY VISIT (OUTPATIENT)
Dept: ANTICOAGULATION | Facility: CLINIC | Age: 68
End: 2018-01-31
Payer: COMMERCIAL

## 2018-01-31 DIAGNOSIS — I48.20 CHRONIC ATRIAL FIBRILLATION (H): ICD-10-CM

## 2018-01-31 DIAGNOSIS — Z79.01 LONG-TERM (CURRENT) USE OF ANTICOAGULANTS: ICD-10-CM

## 2018-01-31 LAB — INR POINT OF CARE: 2.2 (ref 0.86–1.14)

## 2018-01-31 PROCEDURE — 85610 PROTHROMBIN TIME: CPT | Mod: QW

## 2018-01-31 PROCEDURE — 36416 COLLJ CAPILLARY BLOOD SPEC: CPT

## 2018-01-31 PROCEDURE — 99207 ZZC NO CHARGE NURSE ONLY: CPT

## 2018-01-31 NOTE — MR AVS SNAPSHOT
Angelito WRIGHT Espinoza   1/31/2018 11:00 AM   Anticoagulation Therapy Visit    Description:  67 year old male   Provider:   ANTICOAGULATION CLINIC   Department:   Anti Coagulation           INR as of 1/31/2018     Today's INR 2.2      Anticoagulation Summary as of 1/31/2018     INR goal 2.0-3.0   Today's INR 2.2   Full instructions 5 mg on Tue; 7.5 mg all other days   Next INR check 2/28/2018    Indications   Long-term (current) use of anticoagulants [Z79.01] [Z79.01]  Chronic atrial fibrillation (H) [I48.2]         Your next Anticoagulation Clinic appointment(s)     Jan 31, 2018 11:00 AM CST   Anticoagulation Visit with  ANTICOAGULATION CLINIC   [facility] ([facility])    [address] [place] [id number] [phone]            Feb 28, 2018 10:45 AM CST   Anticoagulation Visit with  ANTICOAGULATION CLINIC   [facility] ([facility])    [address] [place] [address] [phone]              Contact Numbers     [facility]  Please call  [phone] to cancel and/or reschedule your appointment   Please call  [phone] with any problems or questions regarding your therapy.        January 2018 Details    Sun Mon Tue Wed Thu Fri Sat      1               2               3               4               5               6                 7               8               9               10               11               12               13                 14               15               16               17               18               19               20                 21               22               23               24               25               26               27                 28               29               30               31      7.5 mg   See details          Date Details   01/31 This INR check               How to take your warfarin dose     To take:  7.5 mg  Take 1.5 of the 5 mg tablets.           February 2018 Details    Sun Mon Tue Wed Thu Fri Sat         1      7.5 mg         2      7.5 mg         3      7.5 mg           4      7.5 mg         5      7.5 mg         6      5 mg         7      7.5 mg         8      7.5 mg         9      7.5 mg         10      7.5 mg           11      7.5 mg         12      7.5 mg         13      5 mg         14      7.5 mg         15      7.5 mg         16      7.5 mg         17      7.5 mg           18      7.5 mg         19      7.5 mg         20      5 mg         21      7.5 mg         22      7.5 mg         23      7.5 mg         24      7.5 mg           25      7.5 mg         26      7.5 mg         27      5 mg         28                Date Details   No additional details    Date of next INR:  2/28/2018         How to take your warfarin dose     To take:  5 mg Take 1 of the 5 mg tablets.    To take:  7.5 mg Take 1.5 of the 5 mg tablets.
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
## 2018-06-21 ENCOUNTER — TELEPHONE (OUTPATIENT)
Dept: ANTICOAGULATION | Facility: CLINIC | Age: 68
End: 2018-06-21

## 2019-03-27 ENCOUNTER — ANTICOAGULATION THERAPY VISIT (OUTPATIENT)
Dept: ANTICOAGULATION | Facility: CLINIC | Age: 69
End: 2019-03-27

## 2019-03-27 DIAGNOSIS — I48.20 CHRONIC ATRIAL FIBRILLATION (H): ICD-10-CM

## (undated) RX ORDER — FENTANYL CITRATE 50 UG/ML
INJECTION, SOLUTION INTRAMUSCULAR; INTRAVENOUS
Status: DISPENSED
Start: 2017-07-24